# Patient Record
Sex: FEMALE | Race: WHITE | NOT HISPANIC OR LATINO | Employment: FULL TIME | URBAN - METROPOLITAN AREA
[De-identification: names, ages, dates, MRNs, and addresses within clinical notes are randomized per-mention and may not be internally consistent; named-entity substitution may affect disease eponyms.]

---

## 2017-01-31 DIAGNOSIS — Z30.9 ENCOUNTER FOR CONTRACEPTIVE MANAGEMENT: ICD-10-CM

## 2017-01-31 RX ORDER — NORGESTREL AND ETHINYL ESTRADIOL 0.3-0.03MG
KIT ORAL
Qty: 84 TABLET | Refills: 0 | Status: SHIPPED | OUTPATIENT
Start: 2017-01-31 | End: 2017-05-31 | Stop reason: SDUPTHER

## 2017-05-31 DIAGNOSIS — Z00.00 ROUTINE GENERAL MEDICAL EXAMINATION AT A HEALTH CARE FACILITY: Primary | ICD-10-CM

## 2017-05-31 DIAGNOSIS — Z30.9 ENCOUNTER FOR CONTRACEPTIVE MANAGEMENT, UNSPECIFIED TYPE: ICD-10-CM

## 2017-05-31 NOTE — TELEPHONE ENCOUNTER
----- Message from Janett Bear sent at 5/31/2017 12:07 PM CDT -----  Contact: pt   Doctor appointment and lab have been scheduled.  Please link lab orders to the lab appointment.  Date of doctor appointment:  9/13  Physical or EP:  epp  Date of lab appointment:  9/6  Comments:

## 2017-05-31 NOTE — TELEPHONE ENCOUNTER
----- Message from Janett Bear sent at 5/31/2017 12:08 PM CDT -----  Contact: pt [phone]  RX request - refill or new RX.  Is this a refill or new RX:  Refill   RX name and strength: LOW-OGESTREL, 28, 0.3-30 mg-mcg per tablet  Directions: once a day   Is this a 30 day or 90 day RX:  30  Pharmacy name and phone #: [facility]  [phone]  Comments:

## 2017-07-10 ENCOUNTER — PATIENT MESSAGE (OUTPATIENT)
Dept: FAMILY MEDICINE | Facility: CLINIC | Age: 47
End: 2017-07-10

## 2017-08-02 ENCOUNTER — OFFICE VISIT (OUTPATIENT)
Dept: OPHTHALMOLOGY | Facility: CLINIC | Age: 47
End: 2017-08-02
Payer: COMMERCIAL

## 2017-08-02 DIAGNOSIS — H52.03 HYPEROPIA, BILATERAL: Primary | ICD-10-CM

## 2017-08-02 DIAGNOSIS — H52.4 PRESBYOPIA: ICD-10-CM

## 2017-08-02 PROCEDURE — 92004 COMPRE OPH EXAM NEW PT 1/>: CPT | Mod: S$GLB,,, | Performed by: OPHTHALMOLOGY

## 2017-08-02 PROCEDURE — 99999 PR PBB SHADOW E&M-EST. PATIENT-LVL II: CPT | Mod: PBBFAC,,, | Performed by: OPHTHALMOLOGY

## 2017-08-02 NOTE — PROGRESS NOTES
Subjective:       Patient ID: Clara Mills is a 46 y.o. female.    Chief Complaint: Eye Exam    HPI  Review of Systems    Objective:      Physical Exam    Assessment:       1. Hyperopia, bilateral    2. Presbyopia        Plan:       RE-Pt wants CL exam for reading vision.        Refer to Dr Fletcher for SCL eval. & yearly F/U.

## 2017-08-03 ENCOUNTER — OFFICE VISIT (OUTPATIENT)
Dept: OPTOMETRY | Facility: CLINIC | Age: 47
End: 2017-08-03
Payer: COMMERCIAL

## 2017-08-03 DIAGNOSIS — H52.4 HYPEROPIA WITH PRESBYOPIA, BILATERAL: Primary | ICD-10-CM

## 2017-08-03 DIAGNOSIS — H52.03 HYPEROPIA WITH PRESBYOPIA, BILATERAL: Primary | ICD-10-CM

## 2017-08-03 PROCEDURE — 99499 UNLISTED E&M SERVICE: CPT | Mod: S$GLB,,, | Performed by: OPTOMETRIST

## 2017-08-03 PROCEDURE — 99999 PR PBB SHADOW E&M-EST. PATIENT-LVL II: CPT | Mod: PBBFAC,,, | Performed by: OPTOMETRIST

## 2017-08-03 PROCEDURE — 92310 CONTACT LENS FITTING OU: CPT | Mod: ,,, | Performed by: OPTOMETRIST

## 2017-08-03 RX ORDER — SULFAMETHOXAZOLE AND TRIMETHOPRIM 800; 160 MG/1; MG/1
1 TABLET ORAL 2 TIMES DAILY
Refills: 0 | COMMUNITY
Start: 2017-06-25 | End: 2017-09-13 | Stop reason: ALTCHOICE

## 2017-08-03 NOTE — PROGRESS NOTES
HPI     DLS: 8/2/2017 with Dr. Alvarez   Pt states near Va /computer is blurry. Pt wear OTC +2.75. Pt denies   floaters, flashes, and eye allergies. Glare is a bother at night. Pt   denies eye sx and procedures.     Pt is interested in trying [facility] CL. This would be pt's first time   wearing CL. Full exam done by Dr Jerry Marino  No gtts    Last edited by Pablo Fletcher, OD on 8/3/2017  2:08 PM. (History)        ROS     Negative for: Constitutional, Gastrointestinal, Neurological, Skin,   Genitourinary, Musculoskeletal, HENT, Endocrine, Cardiovascular, Eyes,   Respiratory, Psychiatric, Allergic/Imm, Heme/Lymph    Last edited by Pablo Fletcher, OD on 8/3/2017  2:08 PM. (History)        Assessment /Plan     For exam results, see Encounter Report.    Hyperopia with presbyopia, bilateral      Pt wishes tot ry monovision--first time wearer.  Good fit/VA w [facility] (OS near)    PLAN:    DISP TRIAL CLS  Reviewed insertion/removal and cleaning  Build up wearing time-4 hours first day, add one hour per day, not to exceed 12 hours per day wear  DW only, clean and disinfect nightly, exchange monthly  Pt advised to use refresh ATs prn for drynes  Discussed adaptation with monovision--cautioned about driving  rtc 1 week CLFU

## 2017-08-03 NOTE — LETTER
August 3, 2017      Pedro Alvarez MD  [address] [facility] [address]           [place] [facility]  [address] [facility] [place] [address]  Phone: [phone]  Fax: [phone]          Patient: Clara Mills   MR Number: [id number]   YOB: 1970   Date of Visit: 8/3/2017       Dear Dr. Pedro Alvarez:    Thank you for referring Clara Mills to me for evaluation. Attached you will find relevant portions of my assessment and plan of care.    If you have questions, please do not hesitate to call me. I look forward to following Clara Mills along with you.    Sincerely,    Pablo Fletcher, OD    Enclosure  CC:  No Recipients    If you would like to receive this communication electronically, please contact [email or web address] or [phone] to request more information on [employer] access.    For providers and/or their staff who would like to refer a patient to [facility], please contact us through our one-stop-shop provider referral line, [facility], at [phone].    If you feel you have received this communication in error or would no longer like to receive these types of communications, please e-mail [email or web address]

## 2017-08-10 ENCOUNTER — OFFICE VISIT (OUTPATIENT)
Dept: OPTOMETRY | Facility: CLINIC | Age: 47
End: 2017-08-10
Payer: COMMERCIAL

## 2017-08-10 DIAGNOSIS — Z46.0 ENCOUNTER FOR FITTING OR ADJUSTMENT OF SPECTACLES OR CONTACT LENSES: Primary | ICD-10-CM

## 2017-08-10 PROCEDURE — 92310 CONTACT LENS FITTING OU: CPT | Mod: ,,, | Performed by: OPTOMETRIST

## 2017-08-10 PROCEDURE — 99499 UNLISTED E&M SERVICE: CPT | Mod: S$GLB,,, | Performed by: OPTOMETRIST

## 2017-09-06 ENCOUNTER — LAB VISIT (OUTPATIENT)
Dept: LAB | Facility: HOSPITAL | Age: 47
End: 2017-09-06
Attending: FAMILY MEDICINE
Payer: COMMERCIAL

## 2017-09-06 DIAGNOSIS — Z00.00 ROUTINE GENERAL MEDICAL EXAMINATION AT A HEALTH CARE FACILITY: ICD-10-CM

## 2017-09-06 LAB
ALBUMIN SERPL BCP-MCNC: 3.3 G/DL
ALP SERPL-CCNC: 70 U/L
ALT SERPL W/O P-5'-P-CCNC: 21 U/L
ANION GAP SERPL CALC-SCNC: 7 MMOL/L
AST SERPL-CCNC: 27 U/L
BASOPHILS # BLD AUTO: 0.03 K/UL
BASOPHILS NFR BLD: 0.6 %
BILIRUB SERPL-MCNC: 1.1 MG/DL
BUN SERPL-MCNC: 10 MG/DL
CALCIUM SERPL-MCNC: 8.8 MG/DL
CHLORIDE SERPL-SCNC: 106 MMOL/L
CHOLEST SERPL-MCNC: 184 MG/DL
CHOLEST/HDLC SERPL: 2.4 {RATIO}
CO2 SERPL-SCNC: 26 MMOL/L
CREAT SERPL-MCNC: 0.7 MG/DL
DIFFERENTIAL METHOD: ABNORMAL
EOSINOPHIL # BLD AUTO: 0.1 K/UL
EOSINOPHIL NFR BLD: 3 %
ERYTHROCYTE [DISTWIDTH] IN BLOOD BY AUTOMATED COUNT: 12.7 %
EST. GFR  (AFRICAN AMERICAN): >60 ML/MIN/1.73 M^2
EST. GFR  (NON AFRICAN AMERICAN): >60 ML/MIN/1.73 M^2
GLUCOSE SERPL-MCNC: 89 MG/DL
HCT VFR BLD AUTO: 36.7 %
HDLC SERPL-MCNC: 78 MG/DL
HDLC SERPL: 42.4 %
HGB BLD-MCNC: 12.5 G/DL
LDLC SERPL CALC-MCNC: 97.2 MG/DL
LYMPHOCYTES # BLD AUTO: 1.2 K/UL
LYMPHOCYTES NFR BLD: 25.5 %
MCH RBC QN AUTO: 31.6 PG
MCHC RBC AUTO-ENTMCNC: 34.1 G/DL
MCV RBC AUTO: 93 FL
MONOCYTES # BLD AUTO: 0.5 K/UL
MONOCYTES NFR BLD: 10.1 %
NEUTROPHILS # BLD AUTO: 2.8 K/UL
NEUTROPHILS NFR BLD: 60.6 %
NONHDLC SERPL-MCNC: 106 MG/DL
PLATELET # BLD AUTO: 250 K/UL
PMV BLD AUTO: 11.5 FL
POTASSIUM SERPL-SCNC: 4.4 MMOL/L
PROT SERPL-MCNC: 6.3 G/DL
RBC # BLD AUTO: 3.96 M/UL
SODIUM SERPL-SCNC: 139 MMOL/L
TRIGL SERPL-MCNC: 44 MG/DL
TSH SERPL DL<=0.005 MIU/L-ACNC: 2.58 UIU/ML
WBC # BLD AUTO: 4.67 K/UL

## 2017-09-06 PROCEDURE — 84443 ASSAY THYROID STIM HORMONE: CPT

## 2017-09-06 PROCEDURE — 80053 COMPREHEN METABOLIC PANEL: CPT

## 2017-09-06 PROCEDURE — 36415 COLL VENOUS BLD VENIPUNCTURE: CPT | Mod: PO

## 2017-09-06 PROCEDURE — 80061 LIPID PANEL: CPT

## 2017-09-06 PROCEDURE — 85025 COMPLETE CBC W/AUTO DIFF WBC: CPT

## 2017-09-13 ENCOUNTER — OFFICE VISIT (OUTPATIENT)
Dept: FAMILY MEDICINE | Facility: CLINIC | Age: 47
End: 2017-09-13
Payer: COMMERCIAL

## 2017-09-13 VITALS
HEIGHT: 64 IN | SYSTOLIC BLOOD PRESSURE: 100 MMHG | WEIGHT: 132.25 LBS | BODY MASS INDEX: 22.58 KG/M2 | TEMPERATURE: 98 F | DIASTOLIC BLOOD PRESSURE: 78 MMHG | HEART RATE: 66 BPM

## 2017-09-13 DIAGNOSIS — F98.8 ATTENTION DEFICIT DISORDER, UNSPECIFIED HYPERACTIVITY PRESENCE: ICD-10-CM

## 2017-09-13 DIAGNOSIS — B00.9 HSV INFECTION: ICD-10-CM

## 2017-09-13 DIAGNOSIS — Z00.00 ROUTINE GENERAL MEDICAL EXAMINATION AT A HEALTH CARE FACILITY: Primary | ICD-10-CM

## 2017-09-13 PROCEDURE — 99396 PREV VISIT EST AGE 40-64: CPT | Mod: S$GLB,,, | Performed by: FAMILY MEDICINE

## 2017-09-13 PROCEDURE — 90715 TDAP VACCINE 7 YRS/> IM: CPT | Mod: S$GLB,,, | Performed by: FAMILY MEDICINE

## 2017-09-13 PROCEDURE — 90471 IMMUNIZATION ADMIN: CPT | Mod: S$GLB,,, | Performed by: FAMILY MEDICINE

## 2017-09-13 PROCEDURE — 99999 PR PBB SHADOW E&M-EST. PATIENT-LVL III: CPT | Mod: PBBFAC,,, | Performed by: FAMILY MEDICINE

## 2017-09-13 NOTE — PROGRESS NOTES
Subjective:     Patient ID: Clara Mills is a 46 y.o. female.    Chief Complaint: Annual Exam    HPI  Review of Systems   Constitutional: Positive for activity change (she is exercising - going to the gym -3 times a week). Negative for unexpected weight change.   HENT: Negative for hearing loss, rhinorrhea and trouble swallowing.    Eyes: Negative for discharge and visual disturbance.   Respiratory: Negative for chest tightness and wheezing.    Cardiovascular: Negative for chest pain and palpitations.   Gastrointestinal: Negative for blood in stool, constipation, diarrhea and vomiting.   Endocrine: Negative for polydipsia and polyuria.   Genitourinary: Negative for difficulty urinating, dysuria, hematuria and menstrual problem.        Menses doing ok without OCs's,     Still on valcyclavir and doing fine with the that   Musculoskeletal: Negative for arthralgias, joint swelling and neck pain.   Neurological: Negative for weakness and headaches.   Psychiatric/Behavioral: Negative for confusion and dysphoric mood.        Her stress level went down since her job changed so she stopped her ADD meds and is doing ok       Objective:      Physical Exam   Constitutional: She is oriented to person, place, and time. She appears well-developed and well-nourished.   HENT:   Head: Normocephalic and atraumatic.   Right Ear: External ear normal.   Left Ear: External ear normal.   Nose: Nose normal.   Mouth/Throat: Oropharynx is clear and moist.   Eyes: Conjunctivae and EOM are normal. Pupils are equal, round, and reactive to light.   Neck: Normal range of motion. Neck supple. No JVD present. No thyromegaly present.   Cardiovascular: Normal rate, regular rhythm, normal heart sounds and intact distal pulses.    No murmur heard.  Pulmonary/Chest: Effort normal and breath sounds normal.   Abdominal: Soft. Bowel sounds are normal. She exhibits no mass. There is no tenderness.   Musculoskeletal: Normal range of motion. She exhibits  no edema.   Lymphadenopathy:     She has no cervical adenopathy.   Neurological: She is alert and oriented to person, place, and time. She has normal reflexes.   Skin: Skin is warm and dry.   Psychiatric: She has a normal mood and affect. Her behavior is normal. Judgment and thought content normal.   Vitals reviewed.      Assessment:     Clara was seen today for annual exam.    Diagnoses and all orders for this visit:    Routine general medical examination at a health care facility  -     (In Office Administered) Tdap Vaccine    Attention deficit disorder, unspecified hyperactivity presence  Doing well without meds   HSV infection  Continuing current management.    yamile model 5 yr risk of breast cancer 0.8%  Average woman 46 risk 1%   She elects to go every 2 years

## 2017-09-13 NOTE — PROGRESS NOTES
Two patient identifiers verified.  Allergies reviewed.  Tdap IM administered to left deltoid per MD order.  Patient tolerated injection well; no redness, bleeding, or bruising noted to injection site.  Patient instructed to remain in clinic setting for 15 minutes.  Verbalizes understanding.

## 2018-02-15 ENCOUNTER — OFFICE VISIT (OUTPATIENT)
Dept: OPTOMETRY | Facility: CLINIC | Age: 48
End: 2018-02-15
Payer: COMMERCIAL

## 2018-02-15 ENCOUNTER — PATIENT MESSAGE (OUTPATIENT)
Dept: FAMILY MEDICINE | Facility: CLINIC | Age: 48
End: 2018-02-15

## 2018-02-15 DIAGNOSIS — H52.03 HYPEROPIA WITH PRESBYOPIA, BILATERAL: Primary | ICD-10-CM

## 2018-02-15 DIAGNOSIS — H52.4 HYPEROPIA WITH PRESBYOPIA, BILATERAL: Primary | ICD-10-CM

## 2018-02-15 DIAGNOSIS — Z46.0 FITTING AND ADJUSTMENT OF SPECTACLES AND CONTACT LENSES: Primary | ICD-10-CM

## 2018-02-15 DIAGNOSIS — Z12.39 SCREENING BREAST EXAMINATION: Primary | ICD-10-CM

## 2018-02-15 PROCEDURE — 92310 CONTACT LENS FITTING OU: CPT | Mod: ,,, | Performed by: OPTOMETRIST

## 2018-02-15 PROCEDURE — 92015 DETERMINE REFRACTIVE STATE: CPT | Mod: S$GLB,,, | Performed by: OPTOMETRIST

## 2018-02-15 PROCEDURE — 99999 PR PBB SHADOW E&M-EST. PATIENT-LVL II: CPT | Mod: PBBFAC,,, | Performed by: OPTOMETRIST

## 2018-02-15 PROCEDURE — 92012 INTRM OPH EXAM EST PATIENT: CPT | Mod: S$GLB,,, | Performed by: OPTOMETRIST

## 2018-02-15 NOTE — PROGRESS NOTES
HPI     Pt wishes to switch to daily disp monovision    Last edited by Pablo Fletcher, OD on 2/15/2018  7:48 AM. (History)        ROS     Negative for: Constitutional, Gastrointestinal, Neurological, Skin,   Genitourinary, Musculoskeletal, HENT, Endocrine, Cardiovascular, Eyes,   Respiratory, Psychiatric, Allergic/Imm, Heme/Lymph    Last edited by Pablo Fletcher, OD on 2/15/2018  7:48 AM. (History)        Assessment /Plan     For exam results, see Encounter Report.    Hyperopia with presbyopia, bilateral      Last year fit pt in [facility] monovision--now wishes to try daily disp.  Good fit w [facility] 1-day (OS near)--increased plus in both eyes to help at near    PLAN:    1. DISP TRIAL CLs--[facility] 1-day (OS near)  2. Continue Daily Wear schedule.  NO SLEEPING IN CONTACT LENSES.  exchange daily  3. Since had DFE 6 mos ago skipped drops today  4. If no problems with trials will call for CLRx, rtc 1 yr

## 2018-02-19 ENCOUNTER — HOSPITAL ENCOUNTER (OUTPATIENT)
Dept: RADIOLOGY | Facility: HOSPITAL | Age: 48
Discharge: HOME | End: 2018-02-19
Attending: FAMILY MEDICINE
Payer: COMMERCIAL

## 2018-02-19 DIAGNOSIS — Z12.39 SCREENING BREAST EXAMINATION: ICD-10-CM

## 2018-02-19 PROCEDURE — 77067 SCR MAMMO BI INCL CAD: CPT | Mod: 26,,, | Performed by: RADIOLOGY

## 2018-02-19 PROCEDURE — 77067 SCR MAMMO BI INCL CAD: CPT | Mod: TC,PO

## 2018-02-19 PROCEDURE — 77063 BREAST TOMOSYNTHESIS BI: CPT | Mod: 26,,, | Performed by: RADIOLOGY

## 2018-04-20 ENCOUNTER — OFFICE VISIT (OUTPATIENT)
Dept: URGENT CARE | Facility: CLINIC | Age: 48
End: 2018-04-20
Payer: COMMERCIAL

## 2018-04-20 VITALS
HEIGHT: 64 IN | OXYGEN SATURATION: 99 % | RESPIRATION RATE: 18 BRPM | SYSTOLIC BLOOD PRESSURE: 112 MMHG | TEMPERATURE: 98 F | HEART RATE: 72 BPM | BODY MASS INDEX: 22.53 KG/M2 | DIASTOLIC BLOOD PRESSURE: 71 MMHG | WEIGHT: 132 LBS

## 2018-04-20 DIAGNOSIS — R31.9 HEMATURIA, UNSPECIFIED TYPE: ICD-10-CM

## 2018-04-20 DIAGNOSIS — N30.01 ACUTE HEMORRHAGIC CYSTITIS: Primary | ICD-10-CM

## 2018-04-20 LAB
BILIRUB UR QL STRIP: NEGATIVE
GLUCOSE UR QL STRIP: NEGATIVE
KETONES UR QL STRIP: NEGATIVE
LEUKOCYTE ESTERASE UR QL STRIP: NEGATIVE
PH, POC UA: 5 (ref 5–8)
POC BLOOD, URINE: POSITIVE
POC NITRATES, URINE: NEGATIVE
PROT UR QL STRIP: POSITIVE
SP GR UR STRIP: 1 (ref 1–1.03)
UROBILINOGEN UR STRIP-ACNC: ABNORMAL (ref 0.1–1.1)

## 2018-04-20 PROCEDURE — 96372 THER/PROPH/DIAG INJ SC/IM: CPT | Mod: S$GLB,,, | Performed by: EMERGENCY MEDICINE

## 2018-04-20 PROCEDURE — 99214 OFFICE O/P EST MOD 30 MIN: CPT | Mod: 25,S$GLB,, | Performed by: EMERGENCY MEDICINE

## 2018-04-20 PROCEDURE — 81003 URINALYSIS AUTO W/O SCOPE: CPT | Mod: QW,S$GLB,, | Performed by: EMERGENCY MEDICINE

## 2018-04-20 RX ORDER — PHENAZOPYRIDINE HYDROCHLORIDE 100 MG/1
100 TABLET, FILM COATED ORAL 3 TIMES DAILY PRN
Qty: 20 TABLET | Refills: 0 | Status: SHIPPED | OUTPATIENT
Start: 2018-04-20 | End: 2018-10-05

## 2018-04-20 RX ORDER — SULFAMETHOXAZOLE AND TRIMETHOPRIM 800; 160 MG/1; MG/1
1 TABLET ORAL 2 TIMES DAILY
Qty: 14 TABLET | Refills: 0 | Status: SHIPPED | OUTPATIENT
Start: 2018-04-20 | End: 2018-04-27

## 2018-04-20 RX ORDER — CEFTRIAXONE 1 G/1
1 INJECTION, POWDER, FOR SOLUTION INTRAMUSCULAR; INTRAVENOUS
Status: COMPLETED | OUTPATIENT
Start: 2018-04-20 | End: 2018-04-20

## 2018-04-20 RX ADMIN — CEFTRIAXONE 1 G: 1 INJECTION, POWDER, FOR SOLUTION INTRAMUSCULAR; INTRAVENOUS at 05:04

## 2018-04-20 NOTE — PATIENT INSTRUCTIONS
"Rest and hydrate with plenty of fluids. Be sure to continuously drink fluids and also at the first urge to urinate, urinate. If not, you are putting yourself at risk for clot retention and acute urinary retention which would not be good. (catheters, leg bags, urology follow up). So be sure to push fluids and urinate whenever the urge arises.    See Hematuria sheet  See Bladder Infection sheet    Rocephin 1 gram shot given in clinic  Bactrim DS Rx for 7 days  Phenazopyridine 100 mg Rx for the symptoms of burning/urgency/pain  Follow up with PCP and call me/text me with any problems/issues.  Bladder Infection, Female (Adult)    Urine is normally doesn't have any bacteria in it. But bacteria can get into the urinary tract from the skin around the rectum. Or they can travel in the blood from elsewhere in the body. Once they are in your urinary tract, they can cause infection in the urethra (urethritis), the bladder (cystitis), or the kidneys (pyelonephritis).  The most common place for an infection is in the bladder. This is called a bladder infection. This is one of the most common infections in women. Most bladder infections are easily treated. They are not serious unless the infection spreads to the kidney.  The phrases "bladder infection," "UTI," and "cystitis" are often used to describe the same thing. But they are not always the same. Cystitis is an inflammation of the bladder. The most common cause of cystitis is an infection.  Symptoms  The infection causes inflammation in the urethra and bladder. This causes many of the symptoms. The most common symptoms of a bladder infection are:  · Pain or burning when urinating  · Having to urinate more often than usual  · Urgent need to urinate  · Only a small amount of urine comes out  · Blood in urine  · Abdominal discomfort. This is usually in the lower abdomen above the pubic bone.  · Cloudy urine  · Strong- or bad-smelling urine  · Unable to urinate (urinary " retention)  · Unable to hold urine in (urinary incontinence)  · Fever  · Loss of appetite  · Confusion (in older adults)  Causes  Bladder infections are not contagious. You can't get one from someone else, from a toilet seat, or from sharing a bath.  The most common cause of bladder infections is bacteria from the bowels. The bacteria get onto the skin around the opening of the urethra. From there, they can get into the urine and travel up to the bladder, causing inflammation and infection. This usually happens because of:  · Wiping improperly after urinating. Always wipe from front to back.  · Bowel incontinence  · Pregnancy  · Procedures such as having a catheter inserted  · Older age  · Not emptying your bladder. This can allow bacteria a chance to grow in your urine.  · Dehydration  · Constipation  · Sex  · Use of a diaphragm for birth control   Treatment  Bladder infections are diagnosed by a urine test. They are treated with antibiotics and usually clear up quickly without complications. Treatment helps prevent a more serious kidney infection.  Medicines  Medicines can help in the treatment of a bladder infection:  · Take antibiotics until they are used up, even if you feel better. It is important to finish them to make sure the infection has cleared.  · You can use acetaminophen or ibuprofen for pain, fever, or discomfort, unless another medicine was prescribed. If you have chronic liver or kidney disease, talk with your healthcare provider before using these medicines. Also talk with your provider if you've ever had a stomach ulcer or gastrointestinal bleeding, or are taking blood-thinner medicines.  · If you are given phenazopydridine to reduce burning with urination, it will cause your urine to become a bright orange color. This can stain clothing.  Care and prevention  These self-care steps can help prevent future infections:  · Drink plenty of fluids to prevent dehydration and flush out your bladder. Do  this unless you must restrict fluids for other health reasons, or your doctor told you not to.  · Proper cleaning after going to the bathroom is important. Wipe from front to back after using the toilet to prevent the spread of bacteria.  · Urinate more often. Don't try to hold urine in for a long time.  · Wear loose-fitting clothes and cotton underwear. Avoid tight-fitting pants.  · Improve your diet and prevent constipation. Eat more fresh fruit and vegetables, and fiber, and less junk and fatty foods.  · Avoid sex until your symptoms are gone.  · Avoid caffeine, alcohol, and spicy foods. These can irritate your bladder.  · Urinate right after intercourse to flush out your bladder.  · If you use birth control pills and have frequent bladder infections, discuss it with your doctor.  Follow-up care  Call your healthcare provider if all symptoms are not gone after 3 days of treatment. This is especially important if you have repeat infections.  If a culture was done, you will be told if your treatment needs to be changed. If directed, you can call to find out the results.  If X-rays were done, you will be told if the results will affect your treatment.  Call 911  Call 911 if any of the following occur:  · Trouble breathing  · Hard to wake up or confusion  · Fainting or loss of consciousness  · Rapid heart rate  When to seek medical advice  Call your healthcare provider right away if any of these occur:  · Fever of 100.4ºF (38.0ºC) or higher, or as directed by your healthcare provider  · Symptoms are not better by the third day of treatment  · Back or belly (abdominal) pain that gets worse  · Repeated vomiting, or unable to keep medicine down  · Weakness or dizziness  · Vaginal discharge  · Pain, redness, or swelling in the outer vaginal area (labia)  Date Last Reviewed: 10/1/2016  © 0741-8503 [employer]. [address] [place] [address], [place] [address]. All rights reserved. This information is not intended as  a substitute for professional medical care. Always follow your healthcare professional's instructions.        Blood in the Urine    Blood in the urine (hematuria) has many possible causes. If it occurs after an injury (such as a car accident or fall), it is most often a sign of bruising to the kidney or bladder. Common causes of blood in the urine include urinary tract infections, kidney stones, inflammation, tumors, or certain other diseases of the kidney or bladder. Menstruation can cause blood to appear in the urine sample, although it is not coming from the urinary tract.  If only a trace amount of blood is present, it will show up on the urine test, even though the urine may be yellow and not pink or red. This may occur with any of the above conditions, as well as heavy exercise or high fever. In this case, your doctor may want to repeat the urine test on another day. This will show if the blood is still present. If it is, then other tests can be done to find out the cause.  Home care  Follow these home care guidelines:  · If your urine does not appear bloody (pink, brown or red) then you do not need to restrict your activity in any way.  · If you can see blood in your urine, rest and avoid heavy exertion until your next exam. Do not use aspirin, blood thinners, or anti-platelet or anti-inflammatory medicines. These include ibuprofen and naproxen. These thin the blood and may increase bleeding.  Follow-up care  Follow up with your healthcare provider, or as advised. If you were injured and had blood in your urine, you should have a repeat urine test in 1 to 2 days. Contact your doctor for this test.  A radiologist will review any X-rays that were taken. You will be told of any new findings that may affect your care.  When to seek medical advice  Call your healthcare provider right away if any of these occur:  · Bright red blood or blood clots in the urine (if you did not have this before)  · Weakness, dizziness  or fainting  · New groin, abdominal, or back pain  · Fever of 100.4ºF (38ºC) or higher, or as directed by your healthcare provider  · Repeated vomiting  · Bleeding from the nose or gums or easy bruising  Date Last Reviewed: 9/1/2016  © 6119-0634 [employer]. [address] [place] [address]. All rights reserved. This information is not intended as a substitute for professional medical care. Always follow your healthcare professional's instructions.

## 2018-04-20 NOTE — PROGRESS NOTES
"Subjective:       Patient ID: Clara Quick is a 47 y.o. female.    Vitals:    04/20/18 1703   BP: 112/71   Pulse: 72   Resp: 18   Temp: 98.2 °F (36.8 °C)   TempSrc: Oral   SpO2: 99%   Weight: 59.9 kg (132 lb)   Height: 5' 4" (1.626 m)       Chief Complaint: Urinary Tract Infection    Pt noticed blood in her urine today around 3.  Pt states it is also painful to urinate. SHE IS S/P BTL. FINISHED PERIOD LAST WEEK. REPORTS NO BACK PAIN, NO ABDOMINAL PAIN, NO PELVIC PAIN. THE ONLY DISCOMFORT OR PAIN IS THE SUPRAPUBIC PAIN OR SEVERE URGENCY TO USE THE RESTROOM WITH SUBSEQUENT URINE OUTPUT THAT OVER THE LAST 2-3 TIMES HAS BECOME INCREASED DARKER AND ACTUALLY BLOODY UPON PRESENTATION TO CLINIC. SHE IS NOT HAVING HESITANCY AT THIS POINT AND STREAM IS FREE FLOWING. HINT OF A CLOT DURING THE TIME SHE GAVE SAMPLE. I AM VERY FAMILIAR WITH [facility] AS SHE WORKS HERE AND WILL BE ABLE TO FOLLOW UP CLOSELY WITH HER AND SHE HAS BEEN INFORMED TO CONTACT ME BY TEXT IF SHE IS HAVING ANY PROBLEMS OR QUESTIONS OF ANY KIND. NO FEVERS, NO CHILLS, NO FLANK PAIN, NO NAUSEA.       Urinary Tract Infection    This is a new problem. The current episode started acute onset. The problem occurs every urination. The problem has been unchanged. The quality of the pain is described as burning. The pain is at a severity of 8/10. The pain is severe. There has been no fever. The fever has been present for less than 1 day. She is sexually active. Associated symptoms include frequency, hematuria, urgency and bubble bath use. Pertinent negatives include no chills, nausea or vomiting. She has tried nothing for the symptoms. The treatment provided no relief. Her past medical history is significant for recurrent UTIs.     Review of Systems   Constitution: Negative for chills and fever.   Skin: Negative for itching.   Musculoskeletal: Negative for back pain.   Gastrointestinal: Negative for abdominal pain, nausea and vomiting.   Genitourinary: Positive " for dysuria, frequency, hematuria, pelvic pain and urgency. Negative for genital sores, missed menses and non-menstrual bleeding.       Objective:      Physical Exam   Constitutional: She is oriented to person, place, and time. She appears well-developed and well-nourished.   HENT:   Head: Normocephalic and atraumatic.   Right Ear: External ear normal.   Left Ear: External ear normal.   Nose: Nose normal.   Mouth/Throat: Mucous membranes are normal.   Eyes: Conjunctivae and lids are normal.   Neck: Trachea normal and full passive range of motion without pain. Neck supple.   Cardiovascular: Normal rate, regular rhythm and normal heart sounds.    Pulmonary/Chest: Effort normal and breath sounds normal. No respiratory distress.   Abdominal: Soft. Normal appearance and bowel sounds are normal. She exhibits no abdominal bruit and no pulsatile midline mass. There is no tenderness.   Musculoskeletal: Normal range of motion. She exhibits no edema.   Neurological: She is alert and oriented to person, place, and time. She has normal strength.   Skin: Skin is warm, dry and intact. She is not diaphoretic. No pallor.   Psychiatric: She has a normal mood and affect. Her speech is normal and behavior is normal. Cognition and memory are normal.   Nursing note and vitals reviewed.      Office Visit on 04/20/2018   Component Date Value Ref Range Status    POC Blood, Urine 04/20/2018 Positive* Negative Final    POC Bilirubin, Urine 04/20/2018 Negative  Negative Final    POC Urobilinogen, Urine 04/20/2018 norm  0.1 - 1.1 Final    POC Ketones, Urine 04/20/2018 Negative  Negative Final    POC Protein, Urine 04/20/2018 Positive* Negative Final    POC Nitrates, Urine 04/20/2018 Negative  Negative Final    POC Glucose, Urine 04/20/2018 Negative  Negative Final    pH, UA 04/20/2018 5.0  5 - 8 Final    POC Specific Gravity, Urine 04/20/2018 1.005  1.003 - 1.029 Final    POC Leukocytes, Urine 04/20/2018 Negative  Negative Final  "      Assessment:       1. Acute hemorrhagic cystitis    2. Hematuria, unspecified type        Plan:       Clara was seen today for urinary tract infection.    Diagnoses and all orders for this visit:    Acute hemorrhagic cystitis  -     POCT Urinalysis, Dipstick, Automated, W/O Scope  -     sulfamethoxazole-trimethoprim 800-160mg (BACTRIM DS) 800-160 mg Tab; Take 1 tablet by mouth 2 (two) times daily.  -     phenazopyridine (PYRIDIUM) 100 MG tablet; Take 1 tablet (100 mg total) by mouth 3 (three) times daily as needed for Pain.  -     cefTRIAXone injection 1 g; Inject 1 g into the muscle one time.    Hematuria, unspecified type          Patient Instructions   Rest and hydrate with plenty of fluids. Be sure to continuously drink fluids and also at the first urge to urinate, urinate. If not, you are putting yourself at risk for clot retention and acute urinary retention which would not be good. (catheters, leg bags, urology follow up). So be sure to push fluids and urinate whenever the urge arises.    See Hematuria sheet  See Bladder Infection sheet    Rocephin 1 gram shot given in clinic  Bactrim DS Rx for 7 days  Phenazopyridine 100 mg Rx for the symptoms of burning/urgency/pain  Follow up with PCP and call me/text me with any problems/issues.  Bladder Infection, Female (Adult)    Urine is normally doesn't have any bacteria in it. But bacteria can get into the urinary tract from the skin around the rectum. Or they can travel in the blood from elsewhere in the body. Once they are in your urinary tract, they can cause infection in the urethra (urethritis), the bladder (cystitis), or the kidneys (pyelonephritis).  The most common place for an infection is in the bladder. This is called a bladder infection. This is one of the most common infections in women. Most bladder infections are easily treated. They are not serious unless the infection spreads to the kidney.  The phrases "bladder infection," "UTI," and " ""cystitis" are often used to describe the same thing. But they are not always the same. Cystitis is an inflammation of the bladder. The most common cause of cystitis is an infection.  Symptoms  The infection causes inflammation in the urethra and bladder. This causes many of the symptoms. The most common symptoms of a bladder infection are:  · Pain or burning when urinating  · Having to urinate more often than usual  · Urgent need to urinate  · Only a small amount of urine comes out  · Blood in urine  · Abdominal discomfort. This is usually in the lower abdomen above the pubic bone.  · Cloudy urine  · Strong- or bad-smelling urine  · Unable to urinate (urinary retention)  · Unable to hold urine in (urinary incontinence)  · Fever  · Loss of appetite  · Confusion (in older adults)  Causes  Bladder infections are not contagious. You can't get one from someone else, from a toilet seat, or from sharing a bath.  The most common cause of bladder infections is bacteria from the bowels. The bacteria get onto the skin around the opening of the urethra. From there, they can get into the urine and travel up to the bladder, causing inflammation and infection. This usually happens because of:  · Wiping improperly after urinating. Always wipe from front to back.  · Bowel incontinence  · Pregnancy  · Procedures such as having a catheter inserted  · Older age  · Not emptying your bladder. This can allow bacteria a chance to grow in your urine.  · Dehydration  · Constipation  · Sex  · Use of a diaphragm for birth control   Treatment  Bladder infections are diagnosed by a urine test. They are treated with antibiotics and usually clear up quickly without complications. Treatment helps prevent a more serious kidney infection.  Medicines  Medicines can help in the treatment of a bladder infection:  · Take antibiotics until they are used up, even if you feel better. It is important to finish them to make sure the infection has " cleared.  · You can use acetaminophen or ibuprofen for pain, fever, or discomfort, unless another medicine was prescribed. If you have chronic liver or kidney disease, talk with your healthcare provider before using these medicines. Also talk with your provider if you've ever had a stomach ulcer or gastrointestinal bleeding, or are taking blood-thinner medicines.  · If you are given phenazopydridine to reduce burning with urination, it will cause your urine to become a bright orange color. This can stain clothing.  Care and prevention  These self-care steps can help prevent future infections:  · Drink plenty of fluids to prevent dehydration and flush out your bladder. Do this unless you must restrict fluids for other health reasons, or your doctor told you not to.  · Proper cleaning after going to the bathroom is important. Wipe from front to back after using the toilet to prevent the spread of bacteria.  · Urinate more often. Don't try to hold urine in for a long time.  · Wear loose-fitting clothes and cotton underwear. Avoid tight-fitting pants.  · Improve your diet and prevent constipation. Eat more fresh fruit and vegetables, and fiber, and less junk and fatty foods.  · Avoid sex until your symptoms are gone.  · Avoid caffeine, alcohol, and spicy foods. These can irritate your bladder.  · Urinate right after intercourse to flush out your bladder.  · If you use birth control pills and have frequent bladder infections, discuss it with your doctor.  Follow-up care  Call your healthcare provider if all symptoms are not gone after 3 days of treatment. This is especially important if you have repeat infections.  If a culture was done, you will be told if your treatment needs to be changed. If directed, you can call to find out the results.  If X-rays were done, you will be told if the results will affect your treatment.  Call 911  Call 911 if any of the following occur:  · Trouble breathing  · Hard to wake up  or confusion  · Fainting or loss of consciousness  · Rapid heart rate  When to seek medical advice  Call your healthcare provider right away if any of these occur:  · Fever of 100.4ºF (38.0ºC) or higher, or as directed by your healthcare provider  · Symptoms are not better by the third day of treatment  · Back or belly (abdominal) pain that gets worse  · Repeated vomiting, or unable to keep medicine down  · Weakness or dizziness  · Vaginal discharge  · Pain, redness, or swelling in the outer vaginal area (labia)  Date Last Reviewed: 10/1/2016  © 4547-5412 [employer]. [address] [place] [address]. All rights reserved. This information is not intended as a substitute for professional medical care. Always follow your healthcare professional's instructions.        Blood in the Urine    Blood in the urine (hematuria) has many possible causes. If it occurs after an injury (such as a car accident or fall), it is most often a sign of bruising to the kidney or bladder. Common causes of blood in the urine include urinary tract infections, kidney stones, inflammation, tumors, or certain other diseases of the kidney or bladder. Menstruation can cause blood to appear in the urine sample, although it is not coming from the urinary tract.  If only a trace amount of blood is present, it will show up on the urine test, even though the urine may be yellow and not pink or red. This may occur with any of the above conditions, as well as heavy exercise or high fever. In this case, your doctor may want to repeat the urine test on another day. This will show if the blood is still present. If it is, then other tests can be done to find out the cause.  Home care  Follow these home care guidelines:  · If your urine does not appear bloody (pink, brown or red) then you do not need to restrict your activity in any way.  · If you can see blood in your urine, rest and avoid heavy exertion until your next exam. Do not  use aspirin, blood thinners, or anti-platelet or anti-inflammatory medicines. These include ibuprofen and naproxen. These thin the blood and may increase bleeding.  Follow-up care  Follow up with your healthcare provider, or as advised. If you were injured and had blood in your urine, you should have a repeat urine test in 1 to 2 days. Contact your doctor for this test.  A radiologist will review any X-rays that were taken. You will be told of any new findings that may affect your care.  When to seek medical advice  Call your healthcare provider right away if any of these occur:  · Bright red blood or blood clots in the urine (if you did not have this before)  · Weakness, dizziness or fainting  · New groin, abdominal, or back pain  · Fever of 100.4ºF (38ºC) or higher, or as directed by your healthcare provider  · Repeated vomiting  · Bleeding from the nose or gums or easy bruising  Date Last Reviewed: 9/1/2016  © 1815-9556 [employer]. [address] [place] [address], [place] [address]. All rights reserved. This information is not intended as a substitute for professional medical care. Always follow your healthcare professional's instructions.

## 2018-04-25 ENCOUNTER — TELEPHONE (OUTPATIENT)
Dept: URGENT CARE | Facility: CLINIC | Age: 48
End: 2018-04-25

## 2018-05-23 ENCOUNTER — PATIENT MESSAGE (OUTPATIENT)
Dept: FAMILY MEDICINE | Facility: CLINIC | Age: 48
End: 2018-05-23

## 2018-06-13 ENCOUNTER — TELEPHONE (OUTPATIENT)
Dept: FAMILY MEDICINE | Facility: CLINIC | Age: 48
End: 2018-06-13

## 2018-06-13 ENCOUNTER — PATIENT MESSAGE (OUTPATIENT)
Dept: FAMILY MEDICINE | Facility: CLINIC | Age: 48
End: 2018-06-13

## 2018-06-13 DIAGNOSIS — Z00.00 ROUTINE GENERAL MEDICAL EXAMINATION AT A HEALTH CARE FACILITY: Primary | ICD-10-CM

## 2018-07-10 ENCOUNTER — TELEPHONE (OUTPATIENT)
Dept: URGENT CARE | Facility: CLINIC | Age: 48
End: 2018-07-10

## 2018-07-10 RX ORDER — CETIRIZINE HYDROCHLORIDE 10 MG/1
10 TABLET ORAL DAILY
Qty: 90 TABLET | Refills: 3 | Status: SHIPPED | OUTPATIENT
Start: 2018-07-10 | End: 2019-08-01 | Stop reason: SDUPTHER

## 2018-07-10 NOTE — TELEPHONE ENCOUNTER
[facility]SENT OVER TO [facility] [address].    7/10/18  BLPMD  16:04

## 2018-10-05 ENCOUNTER — TELEPHONE (OUTPATIENT)
Dept: FAMILY MEDICINE | Facility: CLINIC | Age: 48
End: 2018-10-05

## 2018-12-03 ENCOUNTER — PATIENT MESSAGE (OUTPATIENT)
Dept: FAMILY MEDICINE | Facility: CLINIC | Age: 48
End: 2018-12-03

## 2018-12-07 ENCOUNTER — LAB VISIT (OUTPATIENT)
Dept: LAB | Facility: HOSPITAL | Age: 48
End: 2018-12-07
Attending: FAMILY MEDICINE
Payer: COMMERCIAL

## 2018-12-07 DIAGNOSIS — Z00.00 ROUTINE GENERAL MEDICAL EXAMINATION AT A HEALTH CARE FACILITY: ICD-10-CM

## 2018-12-07 LAB
ALBUMIN SERPL BCP-MCNC: 3.7 G/DL
ALP SERPL-CCNC: 67 U/L
ALT SERPL W/O P-5'-P-CCNC: 14 U/L
ANION GAP SERPL CALC-SCNC: 9 MMOL/L
AST SERPL-CCNC: 20 U/L
BASOPHILS # BLD AUTO: 0.02 K/UL
BASOPHILS NFR BLD: 0.5 %
BILIRUB SERPL-MCNC: 1.2 MG/DL
BUN SERPL-MCNC: 10 MG/DL
CALCIUM SERPL-MCNC: 8.7 MG/DL
CHLORIDE SERPL-SCNC: 106 MMOL/L
CHOLEST SERPL-MCNC: 174 MG/DL
CHOLEST/HDLC SERPL: 2.2 {RATIO}
CO2 SERPL-SCNC: 26 MMOL/L
CREAT SERPL-MCNC: 0.7 MG/DL
DIFFERENTIAL METHOD: ABNORMAL
EOSINOPHIL # BLD AUTO: 0.1 K/UL
EOSINOPHIL NFR BLD: 2.5 %
ERYTHROCYTE [DISTWIDTH] IN BLOOD BY AUTOMATED COUNT: 12.1 %
EST. GFR  (AFRICAN AMERICAN): >60 ML/MIN/1.73 M^2
EST. GFR  (NON AFRICAN AMERICAN): >60 ML/MIN/1.73 M^2
GLUCOSE SERPL-MCNC: 91 MG/DL
HCT VFR BLD AUTO: 37.5 %
HDLC SERPL-MCNC: 78 MG/DL
HDLC SERPL: 44.8 %
HGB BLD-MCNC: 12.1 G/DL
LDLC SERPL CALC-MCNC: 85.8 MG/DL
LYMPHOCYTES # BLD AUTO: 1.3 K/UL
LYMPHOCYTES NFR BLD: 30 %
MCH RBC QN AUTO: 31.5 PG
MCHC RBC AUTO-ENTMCNC: 32.3 G/DL
MCV RBC AUTO: 98 FL
MONOCYTES # BLD AUTO: 0.5 K/UL
MONOCYTES NFR BLD: 10.8 %
NEUTROPHILS # BLD AUTO: 2.5 K/UL
NEUTROPHILS NFR BLD: 55.7 %
NONHDLC SERPL-MCNC: 96 MG/DL
PLATELET # BLD AUTO: 258 K/UL
PMV BLD AUTO: 11 FL
POTASSIUM SERPL-SCNC: 3.7 MMOL/L
PROT SERPL-MCNC: 6.4 G/DL
RBC # BLD AUTO: 3.84 M/UL
SODIUM SERPL-SCNC: 141 MMOL/L
TRIGL SERPL-MCNC: 51 MG/DL
TSH SERPL DL<=0.005 MIU/L-ACNC: 2.55 UIU/ML
WBC # BLD AUTO: 4.43 K/UL

## 2018-12-07 PROCEDURE — 80061 LIPID PANEL: CPT

## 2018-12-07 PROCEDURE — 36415 COLL VENOUS BLD VENIPUNCTURE: CPT

## 2018-12-07 PROCEDURE — 85025 COMPLETE CBC W/AUTO DIFF WBC: CPT

## 2018-12-07 PROCEDURE — 84443 ASSAY THYROID STIM HORMONE: CPT

## 2018-12-07 PROCEDURE — 80053 COMPREHEN METABOLIC PANEL: CPT

## 2018-12-11 ENCOUNTER — OFFICE VISIT (OUTPATIENT)
Dept: FAMILY MEDICINE | Facility: CLINIC | Age: 48
End: 2018-12-11
Payer: COMMERCIAL

## 2018-12-11 ENCOUNTER — HOSPITAL ENCOUNTER (OUTPATIENT)
Dept: RADIOLOGY | Facility: HOSPITAL | Age: 48
Discharge: HOME | End: 2018-12-11
Attending: FAMILY MEDICINE
Payer: COMMERCIAL

## 2018-12-11 VITALS
HEART RATE: 61 BPM | WEIGHT: 138.88 LBS | DIASTOLIC BLOOD PRESSURE: 62 MMHG | TEMPERATURE: 98 F | BODY MASS INDEX: 23.14 KG/M2 | SYSTOLIC BLOOD PRESSURE: 98 MMHG | HEIGHT: 65 IN

## 2018-12-11 DIAGNOSIS — Z00.00 ROUTINE GENERAL MEDICAL EXAMINATION AT A HEALTH CARE FACILITY: Primary | ICD-10-CM

## 2018-12-11 DIAGNOSIS — E80.4 GILBERT'S SYNDROME: ICD-10-CM

## 2018-12-11 DIAGNOSIS — M79.671 RIGHT FOOT PAIN: ICD-10-CM

## 2018-12-11 DIAGNOSIS — B00.9 HSV INFECTION: ICD-10-CM

## 2018-12-11 DIAGNOSIS — F98.8 ATTENTION DEFICIT DISORDER, UNSPECIFIED HYPERACTIVITY PRESENCE: ICD-10-CM

## 2018-12-11 PROCEDURE — 99999 PR PBB SHADOW E&M-EST. PATIENT-LVL IV: CPT | Mod: PBBFAC,,, | Performed by: FAMILY MEDICINE

## 2018-12-11 PROCEDURE — 73630 X-RAY EXAM OF FOOT: CPT | Mod: TC,RT

## 2018-12-11 PROCEDURE — 99396 PREV VISIT EST AGE 40-64: CPT | Mod: S$GLB,,, | Performed by: FAMILY MEDICINE

## 2018-12-11 PROCEDURE — 73630 X-RAY EXAM OF FOOT: CPT | Mod: 26,RT,, | Performed by: RADIOLOGY

## 2018-12-11 RX ORDER — LISDEXAMFETAMINE DIMESYLATE 30 MG/1
30 CAPSULE ORAL EVERY MORNING
Qty: 90 CAPSULE | Refills: 0 | Status: SHIPPED | OUTPATIENT
Start: 2018-12-11 | End: 2019-03-27 | Stop reason: SDUPTHER

## 2018-12-11 RX ORDER — VALACYCLOVIR HYDROCHLORIDE 500 MG/1
500 TABLET, FILM COATED ORAL 2 TIMES DAILY PRN
Qty: 20 TABLET | Refills: 1 | Status: SHIPPED | OUTPATIENT
Start: 2018-12-11 | End: 2020-08-25

## 2018-12-11 NOTE — PATIENT INSTRUCTIONS
Hi All, Here are some recommendations from a friend of mine for primary care people at the [facility].     Dr. Peck is very nice and she works with NP  Lamar Medrano who is very good  as well.   Dr. Ramos is very good.   Dr. Sequeira -very good and [employer] friendly  Dr. Peña Munoz just started here and he seems nice.  He has good availability now since he is new and younger.  Dr. Chance is accepting new patients and is very good.    Dr Matias in OBGYN    Dr Blanca is at [facility] this helps in finding a new PCP.  I recommend looking them up and reading about them and finding the person who seems right for you.  Good luck!  Wishing you all well,  Dr Priscila Leslie

## 2018-12-11 NOTE — PROGRESS NOTES
"Subjective:     Patient ID: Clara Mills is a 47 y.o. female.    Chief Complaint: Annual Exam and Foot Pain (right)    HPI  Review of Systems   Constitutional: Negative for activity change and unexpected weight change.   HENT: Negative for hearing loss, rhinorrhea and trouble swallowing.    Eyes: Negative for discharge and visual disturbance.   Respiratory: Negative for chest tightness and wheezing.    Cardiovascular: Negative for chest pain and palpitations.   Gastrointestinal: Negative for blood in stool, constipation, diarrhea and vomiting.   Endocrine: Negative for polydipsia and polyuria.   Genitourinary: Negative for difficulty urinating, dysuria, hematuria and menstrual problem.   Musculoskeletal: Negative for arthralgias, joint swelling and neck pain.        Rt sided foot pain - for several months- -more of a "burning pain" - worse if she twists her foot while doing aerobics   Neurological: Negative for weakness and headaches.   Psychiatric/Behavioral: Positive for decreased concentration (she started a new job and finding it harder to concentrate. she was on Vyvance in the past and had been doing ok with that but she stopped becasue she felt she didnt need it but with the new job there is so much to take tract of. ). Negative for confusion and dysphoric mood.       Objective:      Physical Exam   Constitutional: She is oriented to person, place, and time. She appears well-developed and well-nourished.   HENT:   Head: Normocephalic and atraumatic.   Right Ear: External ear normal.   Left Ear: External ear normal.   Nose: Nose normal.   Mouth/Throat: Oropharynx is clear and moist.   Eyes: Conjunctivae and EOM are normal. Pupils are equal, round, and reactive to light.   Neck: Normal range of motion. Neck supple. No JVD present. No thyromegaly present.   Cardiovascular: Normal rate, regular rhythm, normal heart sounds and intact distal pulses.   No murmur heard.  Pulmonary/Chest: Effort normal and breath " sounds normal.   Abdominal: Soft. Bowel sounds are normal. She exhibits no mass. There is no tenderness.   Musculoskeletal: Normal range of motion. She exhibits no edema.   Lymphadenopathy:     She has no cervical adenopathy.   Neurological: She is alert and oriented to person, place, and time. She has normal reflexes.   Skin: Skin is warm and dry.   Psychiatric: She has a normal mood and affect. Her behavior is normal. Judgment and thought content normal.   Vitals reviewed.      Assessment:     Clara was seen today for annual exam and foot pain.    Diagnoses and all orders for this visit:    Routine general medical examination at a health care facility    Right foot pain  -     X-Ray Foot 2 View Right; Future    Attention deficit disorder, unspecified hyperactivity presence  -     VYVANSE 30 mg capsule; Take 1 capsule (30 mg total) by mouth every morning.    Gilbert's syndrome    HSV infection  -     valACYclovir (VALTREX) 500 MG tablet; Take 1 tablet (500 mg total) by mouth 2 (two) times daily as needed.

## 2018-12-12 ENCOUNTER — PATIENT MESSAGE (OUTPATIENT)
Dept: FAMILY MEDICINE | Facility: CLINIC | Age: 48
End: 2018-12-12

## 2018-12-12 DIAGNOSIS — I87.2 VENOUS INSUFFICIENCY: Primary | ICD-10-CM

## 2018-12-17 ENCOUNTER — PATIENT MESSAGE (OUTPATIENT)
Dept: FAMILY MEDICINE | Facility: CLINIC | Age: 48
End: 2018-12-17

## 2019-01-07 ENCOUNTER — TELEPHONE (OUTPATIENT)
Dept: OPTOMETRY | Facility: CLINIC | Age: 49
End: 2019-01-07

## 2019-02-18 ENCOUNTER — OFFICE VISIT (OUTPATIENT)
Dept: OPTOMETRY | Facility: CLINIC | Age: 49
End: 2019-02-18
Payer: COMMERCIAL

## 2019-02-18 DIAGNOSIS — H52.4 HYPEROPIA WITH PRESBYOPIA, BILATERAL: Primary | ICD-10-CM

## 2019-02-18 DIAGNOSIS — H52.03 HYPEROPIA WITH PRESBYOPIA, BILATERAL: Primary | ICD-10-CM

## 2019-02-18 DIAGNOSIS — Z46.0 FITTING AND ADJUSTMENT OF SPECTACLES AND CONTACT LENSES: Primary | ICD-10-CM

## 2019-02-18 PROCEDURE — 99999 PR PBB SHADOW E&M-EST. PATIENT-LVL II: CPT | Mod: PBBFAC,,, | Performed by: OPTOMETRIST

## 2019-02-18 PROCEDURE — 92014 COMPRE OPH EXAM EST PT 1/>: CPT | Mod: S$GLB,,, | Performed by: OPTOMETRIST

## 2019-02-18 PROCEDURE — 92310 CONTACT LENS FITTING OU: CPT | Mod: S$GLB,,, | Performed by: OPTOMETRIST

## 2019-02-18 PROCEDURE — 92015 DETERMINE REFRACTIVE STATE: CPT | Mod: S$GLB,,, | Performed by: OPTOMETRIST

## 2019-02-18 PROCEDURE — 92015 PR REFRACTION: ICD-10-PCS | Mod: S$GLB,,, | Performed by: OPTOMETRIST

## 2019-02-18 PROCEDURE — 92014 PR EYE EXAM, EST PATIENT,COMPREHESV: ICD-10-PCS | Mod: S$GLB,,, | Performed by: OPTOMETRIST

## 2019-02-18 PROCEDURE — 99999 PR PBB SHADOW E&M-EST. PATIENT-LVL II: ICD-10-PCS | Mod: PBBFAC,,, | Performed by: OPTOMETRIST

## 2019-02-18 PROCEDURE — 92310 PR CONTACT LENS FITTING (NO CHANGE): ICD-10-PCS | Mod: S$GLB,,, | Performed by: OPTOMETRIST

## 2019-02-18 NOTE — PROGRESS NOTES
"HPI     Wears daily disp CLs, but feels VA getting worse    Last edited by Pablo Fletcher, OD on 2/18/2019  7:55 AM. (History)        ROS     Negative for: Constitutional, Gastrointestinal, Neurological, Skin,   Genitourinary, Musculoskeletal, HENT, Endocrine, Cardiovascular, Eyes,   Respiratory, Psychiatric, Allergic/Imm, Heme/Lymph    Last edited by Pablo Fletcher, OD on 2/18/2019  7:55 AM. (History)        Assessment /Plan     For exam results, see Encounter Report.    Hyperopia with presbyopia, bilateral      Pt wears mono daily disp CLs--good fit 2 OASYS 1-DAY, but pt feels both distance and near VA getting worse.  Discussed if make near VA better will make distance WORSE. Pt wishes to try stronger CL for near    PLAN:    1. OK to wear otc +1.00 for distance, +3.00 for near  2. ORDER TRIALS: OASYS 1-DAY w inc plus in "near" eye OS--I do NOT need to see pt at disp  3. If no problems pt will darin for CLRx, rtc 1 yr  4. If does have distance problems w new Cls she will message me, and I discussed she could get distance spex to wear OVER mono CLs for night driving/etc                 "

## 2019-02-23 ENCOUNTER — PATIENT MESSAGE (OUTPATIENT)
Dept: OPTOMETRY | Facility: CLINIC | Age: 49
End: 2019-02-23

## 2019-03-27 ENCOUNTER — PATIENT MESSAGE (OUTPATIENT)
Dept: FAMILY MEDICINE | Facility: CLINIC | Age: 49
End: 2019-03-27

## 2019-03-27 DIAGNOSIS — F98.8 ATTENTION DEFICIT DISORDER, UNSPECIFIED HYPERACTIVITY PRESENCE: ICD-10-CM

## 2019-03-27 RX ORDER — LISDEXAMFETAMINE DIMESYLATE 30 MG/1
30 CAPSULE ORAL EVERY MORNING
Qty: 90 CAPSULE | Refills: 0 | Status: SHIPPED | OUTPATIENT
Start: 2019-03-27 | End: 2019-08-01 | Stop reason: SDUPTHER

## 2019-07-19 ENCOUNTER — PATIENT MESSAGE (OUTPATIENT)
Dept: FAMILY MEDICINE | Facility: CLINIC | Age: 49
End: 2019-07-19

## 2019-07-29 NOTE — PROGRESS NOTES
"ANNUAL VISIT NOTE     PRESENTING HISTORY     Reason for Visit:  Annual visit.    No chief complaint on file.      History of Present Illness & ROS: Ms. Clara Mills is a 48 y.o. female. Very nice lady. 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   Here for Annual, medication refills, and request to est care with new provider.   Former patient of Dr. OSITO Leslie.  Works out.   Had some "GI symptoms" on Monday, described as "diarrhea and crampy" abdomen. Reportedly seems to be dissipating.       Review of Systems:  Eyes: denies visual changes at this time denies floaters   ENT: no nasal congestion or sore throat  Respiratory: no cough or shorness of breath  Cardiovascular: no chest pain or palpitations  Genitourinary: no hematuria or dysuria; denies frequency  Hematologic/Lymphatic: no easy bruising or lymphadenopathy  Musculoskeletal: no arthralgias or myalgias  Neurological: no seizures or tremors  Endocrine: no heat or cold intolerance    PAST HISTORY:     Past Medical History:   Diagnosis Date    ADD (attention deficit disorder)     HSV infection        Past Surgical History:   Procedure Laterality Date    BUNIONECTOMY Right 09/2016    BUNIONECTOMY Left 12/2016    OTHER SURGICAL HISTORY  1998    uterine suspension    TUBAL LIGATION         Family History   Problem Relation Age of Onset    Other Mother         eye problem    Squamous cell carcinoma Father         with radiation    Cancer Father         skin    No Known Problems Brother     No Known Problems Daughter     No Known Problems Son     No Known Problems Brother     No Known Problems Brother        Social History     Socioeconomic History    Marital status:      Spouse name: Not on file    Number of children: Not on file    Years of education: Not on file    Highest education level: Not on file   Occupational History    Occupation:    Social Needs    Financial resource strain: Not on file    Food insecurity:     Worry: Not on " file     Inability: Not on file    Transportation needs:     Medical: Not on file     Non-medical: Not on file   Tobacco Use    Smoking status: Former Smoker     Years: 20.00    Smokeless tobacco: Never Used   Substance and Sexual Activity    Alcohol use: Yes     Alcohol/week: 3.0 oz     Types: 5 Glasses of wine per week    Drug use: No    Sexual activity: Yes     Partners: Male     Birth control/protection: See Surgical Hx   Lifestyle    Physical activity:     Days per week: Not on file     Minutes per session: Not on file    Stress: Not on file   Relationships    Social connections:     Talks on phone: Not on file     Gets together: Not on file     Attends Zoroastrianism service: Not on file     Active member of club or organization: Not on file     Attends meetings of clubs or organizations: Not on file     Relationship status: Not on file   Other Topics Concern    Not on file   Social History Narrative    Single , lives with boyfriend, 2 children, works [employer]       MEDICATIONS & ALLERGIES:     Current Outpatient Medications on File Prior to Visit   Medication Sig Dispense Refill    cetirizine (ZYRTEC) 10 MG tablet Take 1 tablet (10 mg total) by mouth once daily. 90 tablet 3    valACYclovir (VALTREX) 500 MG tablet Take 1 tablet (500 mg total) by mouth 2 (two) times daily as needed. 20 tablet 1    VYVANSE 30 mg capsule Take 1 capsule (30 mg total) by mouth every morning. 90 capsule 0     No current facility-administered medications on file prior to visit.         Review of patient's allergies indicates:  No Known Allergies    Medications Reconciliation:   I have reconciled the patient's home medications and discharge medications with the patient/family. I have updated all changes.  Refer to After-Visit Medication List.    OBJECTIVE:     Vital Signs:  There were no vitals filed for this visit.  Wt Readings from Last 1 Encounters:   12/11/18 1118 63 kg (138 lb 14.2 oz)     There is no height or weight  on file to calculate BMI.     Wt Readings from Last 3 Encounters:   12/11/18 63 kg (138 lb 14.2 oz)   04/20/18 59.9 kg (132 lb)   09/13/17 60 kg (132 lb 4.4 oz)     Temp Readings from Last 3 Encounters:   12/11/18 98.4 °F (36.9 °C) (Oral)   04/20/18 98.2 °F (36.8 °C) (Oral)   09/13/17 98.4 °F (36.9 °C)     BP Readings from Last 3 Encounters:   08/01/19 108/60   12/11/18 98/62   04/20/18 112/71     Pulse Readings from Last 3 Encounters:   08/01/19 64   12/11/18 61   04/20/18 72         Physical Exam:  General: Well developed, well nourished. No distress.  HEENT: Head is normocephalic, atraumatic; ears are normal.   Eyes: Clear conjunctiva.  Neck: Supple, symmetrical neck; trachea midline.  Lungs: Clear to auscultation bilaterally and normal respiratory effort.  Cardiovascular: Heart with regular rate and rhythm. No murmurs, gallops or rubs  Extremities: No LE edema. Pulses 2+ and symmetric.   Abdomen: Abdomen is soft, non-tender non-distended with normal bowel sounds.  Skin: Skin color, texture, turgor normal. No rashes.  Musculoskeletal: Normal gait.   Lymph Nodes: No cervical or supraclavicular adenopathy.  Neurologic: Normal strength and tone. No focal numbness or weakness.   Psychiatric: Not depressed.    Laboratory  Lab Results   Component Value Date    WBC 4.43 12/07/2018    HGB 12.1 12/07/2018    HCT 37.5 12/07/2018     12/07/2018    CHOL 174 12/07/2018    TRIG 51 12/07/2018    HDL 78 (H) 12/07/2018    ALT 14 12/07/2018    AST 20 12/07/2018     12/07/2018    K 3.7 12/07/2018     12/07/2018    CREATININE 0.7 12/07/2018    BUN 10 12/07/2018    CO2 26 12/07/2018    TSH 2.546 12/07/2018         ASSESSMENT & PLAN:     Answers for HPI/ROS submitted by the patient on 8/1/2019   activity change: No  unexpected weight change: No  neck pain: No  hearing loss: No  rhinorrhea: No  trouble swallowing: No  eye discharge: No  visual disturbance: No  chest tightness: No  wheezing: No  chest pain:  No  palpitations: No  blood in stool: No  constipation: No  vomiting: No  diarrhea: No  polydipsia: No  polyuria: No  difficulty urinating: No  hematuria: No  menstrual problem: Yes  dysuria: No  joint swelling: No  arthralgias: No  headaches: No  weakness: No  confusion: No  dysphoric mood: No    Preventative health care  -     Comprehensive metabolic panel; Future; Expected date: 08/01/2019  -     CBC auto differential; Future; Expected date: 08/01/2019  -     Lipid panel; Future; Expected date: 08/01/2019  -     Hemoglobin A1c; Future; Expected date: 08/01/2019  -     TSH; Future; Expected date: 08/01/2019  -     Vitamin D; Future; Expected date: 08/01/2019  -     Mammo Digital Screening Bilateral With CAD; Future; Expected date: 08/01/2019    Attention deficit disorder, unspecified hyperactivity presence  -     Comprehensive metabolic panel; Future; Expected date: 08/01/2019  -     CBC auto differential; Future; Expected date: 08/01/2019  -     Lipid panel; Future; Expected date: 08/01/2019  -     Hemoglobin A1c; Future; Expected date: 08/01/2019  -     TSH; Future; Expected date: 08/01/2019  -     Vitamin D; Future; Expected date: 08/01/2019  -     Mammo Digital Screening Bilateral With CAD; Future; Expected date: 08/01/2019  -     lisdexamfetamine (VYVANSE) 30 MG capsule; Take 1 capsule (30 mg total) by mouth every morning.  Dispense: 90 capsule; Refill: 0    Gilbert's syndrome  -     Comprehensive metabolic panel; Future; Expected date: 08/01/2019  -     CBC auto differential; Future; Expected date: 08/01/2019  -     Lipid panel; Future; Expected date: 08/01/2019  -     Hemoglobin A1c; Future; Expected date: 08/01/2019  -     TSH; Future; Expected date: 08/01/2019  -     Vitamin D; Future; Expected date: 08/01/2019  -     Mammo Digital Screening Bilateral With CAD; Future; Expected date: 08/01/2019    Other orders  -     cetirizine (ZYRTEC) 10 MG tablet; Take 1 tablet (10 mg total) by mouth daily as needed  for Allergies.  Dispense: 90 tablet; Refill: 3      Medical Issues:   ADD:   Vyvanse; will not be managed long term by this provider; will need to be seen by Physician to continue to get her refills after today; will refill a 30 day supply in the interim.     HSV:   PRN Valtrex    Suspect recent Gastroenteritis:   Symptoms are improving; have advised to reach back out if her symptoms should not continue to get better over the next couple of days. May need to have stool checked (c/o diarrhea).     *Suggest follow up with Dr. KALINA Becerril in 4 weeks to fully est care with our practice.         Future Appointments   Date Time Provider Department Center   8/1/2019  7:40 AM LAB[facility]        Medication List           Accurate as of 8/1/19  7:39 AM. If you have any questions, ask your nurse or doctor.               CHANGE how you take these medications    cetirizine 10 MG tablet  Commonly known as:  ZyrTEC  Take 1 tablet (10 mg total) by mouth daily as needed for Allergies.  What changed:    · when to take this  · reasons to take this  Changed by:  SOBIA Shaw        CONTINUE taking these medications    lisdexamfetamine 30 MG capsule  Commonly known as:  VYVANSE  Take 1 capsule (30 mg total) by mouth every morning.     valACYclovir 500 MG tablet  Commonly known as:  VALTREX  Take 1 tablet (500 mg total) by mouth 2 (two) times daily as needed.           Where to Get Your Medications      These medications were sent to [facility]/pharmacy #[id number] - [place], [place] - [address]  [address], [facility] [address]    Phone:  [phone]   · lisdexamfetamine 30 MG capsule     Information about where to get these medications is not yet available    Ask your nurse or doctor about these medications  · cetirizine 10 MG tablet         Signing Physician:  SOBIA Shaw

## 2019-08-01 ENCOUNTER — PATIENT MESSAGE (OUTPATIENT)
Dept: INTERNAL MEDICINE | Facility: CLINIC | Age: 49
End: 2019-08-01

## 2019-08-01 ENCOUNTER — LAB VISIT (OUTPATIENT)
Dept: LAB | Facility: HOSPITAL | Age: 49
End: 2019-08-01
Attending: NURSE PRACTITIONER
Payer: COMMERCIAL

## 2019-08-01 ENCOUNTER — TELEPHONE (OUTPATIENT)
Dept: INTERNAL MEDICINE | Facility: CLINIC | Age: 49
End: 2019-08-01

## 2019-08-01 ENCOUNTER — OFFICE VISIT (OUTPATIENT)
Dept: INTERNAL MEDICINE | Facility: CLINIC | Age: 49
End: 2019-08-01
Payer: COMMERCIAL

## 2019-08-01 VITALS — HEART RATE: 64 BPM | SYSTOLIC BLOOD PRESSURE: 108 MMHG | DIASTOLIC BLOOD PRESSURE: 60 MMHG | RESPIRATION RATE: 18 BRPM

## 2019-08-01 DIAGNOSIS — F98.8 ATTENTION DEFICIT DISORDER, UNSPECIFIED HYPERACTIVITY PRESENCE: ICD-10-CM

## 2019-08-01 DIAGNOSIS — Z00.00 PREVENTATIVE HEALTH CARE: Primary | ICD-10-CM

## 2019-08-01 DIAGNOSIS — E80.4 GILBERT'S SYNDROME: ICD-10-CM

## 2019-08-01 DIAGNOSIS — Z00.00 PREVENTATIVE HEALTH CARE: ICD-10-CM

## 2019-08-01 LAB
25(OH)D3+25(OH)D2 SERPL-MCNC: 31 NG/ML (ref 30–96)
ALBUMIN SERPL BCP-MCNC: 4.2 G/DL (ref 3.5–5.2)
ALP SERPL-CCNC: 64 U/L (ref 55–135)
ALT SERPL W/O P-5'-P-CCNC: 12 U/L (ref 10–44)
ANION GAP SERPL CALC-SCNC: 7 MMOL/L (ref 8–16)
AST SERPL-CCNC: 23 U/L (ref 10–40)
BASOPHILS # BLD AUTO: 0.03 K/UL (ref 0–0.2)
BASOPHILS NFR BLD: 0.7 % (ref 0–1.9)
BILIRUB SERPL-MCNC: 0.8 MG/DL (ref 0.1–1)
BUN SERPL-MCNC: 15 MG/DL (ref 6–20)
CALCIUM SERPL-MCNC: 9.3 MG/DL (ref 8.7–10.5)
CHLORIDE SERPL-SCNC: 105 MMOL/L (ref 95–110)
CHOLEST SERPL-MCNC: 190 MG/DL (ref 120–199)
CHOLEST/HDLC SERPL: 2.1 {RATIO} (ref 2–5)
CO2 SERPL-SCNC: 26 MMOL/L (ref 23–29)
CREAT SERPL-MCNC: 0.8 MG/DL (ref 0.5–1.4)
DIFFERENTIAL METHOD: ABNORMAL
EOSINOPHIL # BLD AUTO: 0.1 K/UL (ref 0–0.5)
EOSINOPHIL NFR BLD: 2.2 % (ref 0–8)
ERYTHROCYTE [DISTWIDTH] IN BLOOD BY AUTOMATED COUNT: 12.3 % (ref 11.5–14.5)
EST. GFR  (AFRICAN AMERICAN): >60 ML/MIN/1.73 M^2
EST. GFR  (NON AFRICAN AMERICAN): >60 ML/MIN/1.73 M^2
ESTIMATED AVG GLUCOSE: 88 MG/DL (ref 68–131)
GLUCOSE SERPL-MCNC: 91 MG/DL (ref 70–110)
HBA1C MFR BLD HPLC: 4.7 % (ref 4–5.6)
HCT VFR BLD AUTO: 36.1 % (ref 37–48.5)
HDLC SERPL-MCNC: 89 MG/DL (ref 40–75)
HDLC SERPL: 46.8 % (ref 20–50)
HGB BLD-MCNC: 11.6 G/DL (ref 12–16)
LDLC SERPL CALC-MCNC: 93 MG/DL (ref 63–159)
LYMPHOCYTES # BLD AUTO: 1.2 K/UL (ref 1–4.8)
LYMPHOCYTES NFR BLD: 26.3 % (ref 18–48)
MCH RBC QN AUTO: 30.6 PG (ref 27–31)
MCHC RBC AUTO-ENTMCNC: 32.1 G/DL (ref 32–36)
MCV RBC AUTO: 95 FL (ref 82–98)
MONOCYTES # BLD AUTO: 0.5 K/UL (ref 0.3–1)
MONOCYTES NFR BLD: 11.7 % (ref 4–15)
NEUTROPHILS # BLD AUTO: 2.7 K/UL (ref 1.8–7.7)
NEUTROPHILS NFR BLD: 59.1 % (ref 38–73)
NONHDLC SERPL-MCNC: 101 MG/DL
PLATELET # BLD AUTO: 286 K/UL (ref 150–350)
PMV BLD AUTO: 11.8 FL (ref 9.2–12.9)
POTASSIUM SERPL-SCNC: 3.7 MMOL/L (ref 3.5–5.1)
PROT SERPL-MCNC: 7.1 G/DL (ref 6–8.4)
RBC # BLD AUTO: 3.79 M/UL (ref 4–5.4)
SODIUM SERPL-SCNC: 138 MMOL/L (ref 136–145)
TRIGL SERPL-MCNC: 40 MG/DL (ref 30–150)
TSH SERPL DL<=0.005 MIU/L-ACNC: 1.78 UIU/ML (ref 0.4–4)
WBC # BLD AUTO: 4.6 K/UL (ref 3.9–12.7)

## 2019-08-01 PROCEDURE — 99386 PREV VISIT NEW AGE 40-64: CPT | Mod: S$GLB,,, | Performed by: NURSE PRACTITIONER

## 2019-08-01 PROCEDURE — 80053 COMPREHEN METABOLIC PANEL: CPT

## 2019-08-01 PROCEDURE — 36415 COLL VENOUS BLD VENIPUNCTURE: CPT

## 2019-08-01 PROCEDURE — 85025 COMPLETE CBC W/AUTO DIFF WBC: CPT

## 2019-08-01 PROCEDURE — 99999 PR PBB SHADOW E&M-EST. PATIENT-LVL IV: CPT | Mod: PBBFAC,,, | Performed by: NURSE PRACTITIONER

## 2019-08-01 PROCEDURE — 80061 LIPID PANEL: CPT

## 2019-08-01 PROCEDURE — 82306 VITAMIN D 25 HYDROXY: CPT

## 2019-08-01 PROCEDURE — 99386 PR PREVENTIVE VISIT,NEW,40-64: ICD-10-PCS | Mod: S$GLB,,, | Performed by: NURSE PRACTITIONER

## 2019-08-01 PROCEDURE — 84443 ASSAY THYROID STIM HORMONE: CPT

## 2019-08-01 PROCEDURE — 83036 HEMOGLOBIN GLYCOSYLATED A1C: CPT

## 2019-08-01 PROCEDURE — 99999 PR PBB SHADOW E&M-EST. PATIENT-LVL IV: ICD-10-PCS | Mod: PBBFAC,,, | Performed by: NURSE PRACTITIONER

## 2019-08-01 RX ORDER — LISDEXAMFETAMINE DIMESYLATE 30 MG/1
30 CAPSULE ORAL EVERY MORNING
Qty: 90 CAPSULE | Refills: 0 | Status: SHIPPED | OUTPATIENT
Start: 2019-08-01 | End: 2020-04-22

## 2019-08-01 RX ORDER — CETIRIZINE HYDROCHLORIDE 10 MG/1
10 TABLET ORAL DAILY PRN
Qty: 90 TABLET | Refills: 3
Start: 2019-08-01 | End: 2019-10-30

## 2019-08-01 NOTE — TELEPHONE ENCOUNTER
Vitamin D: 31      Lab Results   Component Value Date    TSH 1.778 08/01/2019       Lab Results   Component Value Date    HGBA1C 4.7 08/01/2019       Lipid Panel   Lab Results   Component Value Date    CHOL 190 08/01/2019    CHOL 174 12/07/2018    CHOL 184 09/06/2017     Lab Results   Component Value Date    HDL 89 (H) 08/01/2019    HDL 78 (H) 12/07/2018    HDL 78 (H) 09/06/2017     Lab Results   Component Value Date    LDLCALC 93.0 08/01/2019    LDLCALC 85.8 12/07/2018    LDLCALC 97.2 09/06/2017     Lab Results   Component Value Date    TRIG 40 08/01/2019    TRIG 51 12/07/2018    TRIG 44 09/06/2017     Lab Results   Component Value Date    CHOLHDL 46.8 08/01/2019    CHOLHDL 44.8 12/07/2018    CHOLHDL 42.4 09/06/2017       CBC  Lab Results   Component Value Date    WBC 4.60 08/01/2019    HGB 11.6 (L) 08/01/2019    HCT 36.1 (L) 08/01/2019    MCV 95 08/01/2019     08/01/2019     CMP  Sodium   Date Value Ref Range Status   08/01/2019 138 136 - 145 mmol/L Final     Potassium   Date Value Ref Range Status   08/01/2019 3.7 3.5 - 5.1 mmol/L Final     Chloride   Date Value Ref Range Status   08/01/2019 105 95 - 110 mmol/L Final     CO2   Date Value Ref Range Status   08/01/2019 26 23 - 29 mmol/L Final     Glucose   Date Value Ref Range Status   08/01/2019 91 70 - 110 mg/dL Final     BUN, Bld   Date Value Ref Range Status   08/01/2019 15 6 - 20 mg/dL Final     Creatinine   Date Value Ref Range Status   08/01/2019 0.8 0.5 - 1.4 mg/dL Final     Calcium   Date Value Ref Range Status   08/01/2019 9.3 8.7 - 10.5 mg/dL Final     Total Protein   Date Value Ref Range Status   08/01/2019 7.1 6.0 - 8.4 g/dL Final     Albumin   Date Value Ref Range Status   08/01/2019 4.2 3.5 - 5.2 g/dL Final     Total Bilirubin   Date Value Ref Range Status   08/01/2019 0.8 0.1 - 1.0 mg/dL Final     Comment:     For infants and newborns, interpretation of results should be based  on gestational age, weight and in agreement with  clinical  observations.  Premature Infant recommended reference ranges:  Up to 24 hours.............<8.0 mg/dL  Up to 48 hours............<12.0 mg/dL  3-5 days..................<15.0 mg/dL  6-29 days.................<15.0 mg/dL       Alkaline Phosphatase   Date Value Ref Range Status   08/01/2019 64 55 - 135 U/L Final     AST   Date Value Ref Range Status   08/01/2019 23 10 - 40 U/L Final     ALT   Date Value Ref Range Status   08/01/2019 12 10 - 44 U/L Final     Anion Gap   Date Value Ref Range Status   08/01/2019 7 (L) 8 - 16 mmol/L Final     eGFR if    Date Value Ref Range Status   08/01/2019 >60 >60 mL/min/1.73 m^2 Final     eGFR if non    Date Value Ref Range Status   08/01/2019 >60 >60 mL/min/1.73 m^2 Final     Comment:     Calculation used to obtain the estimated glomerular filtration  rate (eGFR) is the CKD-EPI equation.          Results released to patient portal    SDJ

## 2019-08-02 ENCOUNTER — TELEPHONE (OUTPATIENT)
Dept: INTERNAL MEDICINE | Facility: CLINIC | Age: 49
End: 2019-08-02

## 2019-08-02 ENCOUNTER — PATIENT MESSAGE (OUTPATIENT)
Dept: INTERNAL MEDICINE | Facility: CLINIC | Age: 49
End: 2019-08-02

## 2019-08-02 NOTE — TELEPHONE ENCOUNTER
----- Message from Chapin Abraham sent at 8/2/2019 11:51 AM CDT -----  Contact: [facility] [phone]  Pharmacy is calling to clarify an RX.  RX name:  lisdexamfetamine (VYVANSE) 30 MG capsule  What do they need to clarify:  Insurance company said they needs a PA or change it to alternative/adderal

## 2019-08-02 NOTE — TELEPHONE ENCOUNTER
----- Message from Chapin Abraham sent at 8/2/2019 11:51 AM CDT -----  Contact: [facility] [phone]  Pharmacy is calling to clarify an RX.  RX name:  lisdexamfetamine (VYVANSE) 30 MG capsule  What do they need to clarify:  Insurance company said they needs a PA or change it to alternative/adderal

## 2019-08-05 ENCOUNTER — PATIENT MESSAGE (OUTPATIENT)
Dept: INTERNAL MEDICINE | Facility: CLINIC | Age: 49
End: 2019-08-05

## 2019-08-07 ENCOUNTER — TELEPHONE (OUTPATIENT)
Dept: INTERNAL MEDICINE | Facility: CLINIC | Age: 49
End: 2019-08-07

## 2019-08-07 ENCOUNTER — HOSPITAL ENCOUNTER (OUTPATIENT)
Dept: RADIOLOGY | Facility: HOSPITAL | Age: 49
Discharge: HOME | End: 2019-08-07
Attending: NURSE PRACTITIONER
Payer: COMMERCIAL

## 2019-08-07 VITALS — BODY MASS INDEX: 21.66 KG/M2 | HEIGHT: 65 IN | WEIGHT: 130 LBS

## 2019-08-07 DIAGNOSIS — E80.4 GILBERT'S SYNDROME: ICD-10-CM

## 2019-08-07 DIAGNOSIS — Z00.00 PREVENTATIVE HEALTH CARE: ICD-10-CM

## 2019-08-07 DIAGNOSIS — F98.8 ATTENTION DEFICIT DISORDER, UNSPECIFIED HYPERACTIVITY PRESENCE: ICD-10-CM

## 2019-08-07 PROCEDURE — 77063 MAMMO DIGITAL SCREENING BILAT WITH TOMOSYNTHESIS_CAD: ICD-10-PCS | Mod: 26,,, | Performed by: RADIOLOGY

## 2019-08-07 PROCEDURE — 77063 BREAST TOMOSYNTHESIS BI: CPT | Mod: 26,,, | Performed by: RADIOLOGY

## 2019-08-07 PROCEDURE — 77067 MAMMO DIGITAL SCREENING BILAT WITH TOMOSYNTHESIS_CAD: ICD-10-PCS | Mod: 26,,, | Performed by: RADIOLOGY

## 2019-08-07 PROCEDURE — 77067 SCR MAMMO BI INCL CAD: CPT | Mod: TC

## 2019-08-07 PROCEDURE — 77067 SCR MAMMO BI INCL CAD: CPT | Mod: 26,,, | Performed by: RADIOLOGY

## 2019-08-07 RX ORDER — DEXTROAMPHETAMINE SACCHARATE, AMPHETAMINE ASPARTATE MONOHYDRATE, DEXTROAMPHETAMINE SULFATE AND AMPHETAMINE SULFATE 5; 5; 5; 5 MG/1; MG/1; MG/1; MG/1
20 CAPSULE, EXTENDED RELEASE ORAL EVERY MORNING
Qty: 30 CAPSULE | Refills: 0 | Status: SHIPPED | OUTPATIENT
Start: 2019-08-07 | End: 2020-04-22

## 2019-08-07 NOTE — TELEPHONE ENCOUNTER
"Received a message in regards to needing a PA on her Vyvanse and no longer covered on her insurance formulary. Insurance is recommending Concerta or Adderall instead.   Clara was off the Vyvanase, after taking for years, but was resumed in December of last year (2018) due to having difficulty with a new job position; difficulty concentrating and focusing, interfering with performance and ability to complete task; since resuming, she reports "doing well". Took sparingly. She has been informed that will send in for 30 tabs of Adderall, but will need to follow up a Physician in [facility] to manage long term, or can refer to Psychiatry.   She was in agreement with the plan.     JN   "

## 2019-08-12 ENCOUNTER — TELEPHONE (OUTPATIENT)
Dept: INTERNAL MEDICINE | Facility: CLINIC | Age: 49
End: 2019-08-12

## 2019-08-12 NOTE — TELEPHONE ENCOUNTER
Impression:  No mammographic evidence of malignancy.     BI-RADS Category 1: Negative     Recommendation:  Routine screening mammogram in 1 year is recommended.     The patient's estimated lifetime risk of breast cancer (to age 85) based on Tyrer-Cuzick - 7 risk assessment model is: Tyrer-Cuzick: 10.47 %. According to the American Cancer Society,  patients with a lifetime breast cancer risk of 20% or higher might benefit from supplemental screening tests.      Assessment     Overall    1 - Negative    Breast Density      Overall   Breast Composition c - Heterogeneously dense                 Results released to patient portal.

## 2019-12-19 RX ORDER — CETIRIZINE HYDROCHLORIDE 10 MG/1
10 TABLET ORAL DAILY PRN
Qty: 90 TABLET | Refills: 3
Start: 2019-12-19 | End: 2020-03-18

## 2020-03-18 ENCOUNTER — TELEPHONE (OUTPATIENT)
Dept: OBSTETRICS AND GYNECOLOGY | Facility: CLINIC | Age: 50
End: 2020-03-18

## 2020-03-18 NOTE — TELEPHONE ENCOUNTER
New pt states her periods have changed.  now heavier and more painful.  Thinks its hormone related.      Calling pt to let her know Dr. Beltran is on the emergency team at the hospital and she would see another MD on Friday.  Pt requesting to wait until Dr. Yoon is back in the office. Rescheduled appt.

## 2020-04-21 DIAGNOSIS — Z01.84 ANTIBODY RESPONSE EXAMINATION: ICD-10-CM

## 2020-04-22 ENCOUNTER — OFFICE VISIT (OUTPATIENT)
Dept: OBSTETRICS AND GYNECOLOGY | Facility: CLINIC | Age: 50
End: 2020-04-22
Payer: COMMERCIAL

## 2020-04-22 VITALS — TEMPERATURE: 99 F | WEIGHT: 136.69 LBS | BODY MASS INDEX: 22.77 KG/M2 | HEIGHT: 65 IN

## 2020-04-22 DIAGNOSIS — N92.4 EXCESSIVE BLEEDING IN PREMENOPAUSAL PERIOD: Primary | ICD-10-CM

## 2020-04-22 DIAGNOSIS — R32 URINARY INCONTINENCE, UNSPECIFIED TYPE: ICD-10-CM

## 2020-04-22 DIAGNOSIS — N92.0 MENORRHAGIA WITH REGULAR CYCLE: ICD-10-CM

## 2020-04-22 PROCEDURE — 99204 PR OFFICE/OUTPT VISIT, NEW, LEVL IV, 45-59 MIN: ICD-10-PCS | Mod: S$GLB,,, | Performed by: OBSTETRICS & GYNECOLOGY

## 2020-04-22 PROCEDURE — 3008F PR BODY MASS INDEX (BMI) DOCUMENTED: ICD-10-PCS | Mod: CPTII,S$GLB,, | Performed by: OBSTETRICS & GYNECOLOGY

## 2020-04-22 PROCEDURE — 99204 OFFICE O/P NEW MOD 45 MIN: CPT | Mod: S$GLB,,, | Performed by: OBSTETRICS & GYNECOLOGY

## 2020-04-22 PROCEDURE — 87481 CANDIDA DNA AMP PROBE: CPT | Mod: 59

## 2020-04-22 PROCEDURE — 87491 CHLMYD TRACH DNA AMP PROBE: CPT

## 2020-04-22 PROCEDURE — 99999 PR PBB SHADOW E&M-EST. PATIENT-LVL III: CPT | Mod: PBBFAC,,, | Performed by: OBSTETRICS & GYNECOLOGY

## 2020-04-22 PROCEDURE — 87661 TRICHOMONAS VAGINALIS AMPLIF: CPT

## 2020-04-22 PROCEDURE — 87801 DETECT AGNT MULT DNA AMPLI: CPT

## 2020-04-22 PROCEDURE — 99999 PR PBB SHADOW E&M-EST. PATIENT-LVL III: ICD-10-PCS | Mod: PBBFAC,,, | Performed by: OBSTETRICS & GYNECOLOGY

## 2020-04-22 PROCEDURE — 3008F BODY MASS INDEX DOCD: CPT | Mod: CPTII,S$GLB,, | Performed by: OBSTETRICS & GYNECOLOGY

## 2020-04-22 PROCEDURE — 87086 URINE CULTURE/COLONY COUNT: CPT

## 2020-04-22 RX ORDER — CETIRIZINE HYDROCHLORIDE 10 MG/1
10 TABLET ORAL DAILY
COMMUNITY
End: 2020-08-25

## 2020-04-22 NOTE — PROGRESS NOTES
"  Chief Complaint: Menorrhagia     HPI:      Clara Mills is a 49 y.o.  who presents complaining of heavier cycles for the past year. Bleeding through tampons multiple times daily. A lot of pain in bilateral lower quadrants. Ripping type pain. Sometimes occurs while on menses and sometimes occurs while not. Worse the first two days of menses. Periods last 7 days.  No bleeding in between.     Currently sexually active. Single partner. Sometimes pain with sex. Pain does not improve with position changes. No hot flashes. No vaginal dryness.   Feelsl taiwo she's leaking urine and has to wear pantyliners. This has been going on for about the past year as well. She is currently using bilateral tubal ligation for contraception. Patient has regular monthly menses. LMP 20.    ROS:     GENERAL: Denies fevers or chills. Feeling well overall.   ABDOMEN: Denies abdominal pain, constipation, diarrhea, nausea, vomiting, change in appetite.   URINARY: Denies frequency, dysuria, hematuria.  NEUROLOGIC: Denies syncope or weakness.     Physical Exam:      PHYSICAL EXAM:  Temp 99 °F (37.2 °C)   Ht 5' 5" (1.651 m)   Wt 62 kg (136 lb 11 oz)   BMI 22.75 kg/m²   Body mass index is 22.75 kg/m².     APPEARANCE: Well nourished, well developed, in no acute distress.  ABDOMEN: Soft.  No tenderness or masses.    PELVIC: Normal external genitalia without lesions.  Normal hair distribution.  Adequate perineal body, normal urethral meatus.  Vagina moist and well rugated without lesions. Small amount of blood within the vaginal vault. Cervix pink, without lesions, discharge or tenderness.  No significant cystocele or rectocele.  Bimanual exam shows uterus to be normal size, regular, mobile and nontender.  Adnexa without masses or tenderness.    EXTREMITIES: No edema.     Results:      US Pelvis Comp with Transvag NON-OB (xpd  Narrative: EXAMINATION:  US PELVIS COMP WITH TRANSVAG NON-OB (XPD)    CLINICAL HISTORY:  Excessive and " frequent menstruation with regular cycle    TECHNIQUE:  Transabdominal sonography of the pelvis was performed, followed by transvaginal sonography to better evaluate the uterus and ovaries.    COMPARISON:  None.    FINDINGS:  Uterus:    Size: 8.5 cm x 4.5 cm x 5.6 cm    Masses: None    Endometrium: The endometrium measures 13 mm.  On the posterior aspect of the body of the endometrial there is a 1.6 cm x 8 mm x 1.7 cm circumscribed, oval echogenic focus.  No definitive associated vascular supply.    Right ovary:    Size: 2.1 cm x 1 cm x 1.9 cm    Appearance: Normal    Vascular flow: Normal.    Left ovary:    Size: 1.9 cm x 2 cm x 1.4 cm    Appearance: Normal    Vascular Flow: Normal.    Free Fluid:    None.  Impression: Today's findings are concerning for an endometrial polyp.  If deemed clinically indicated, tissue sampling may be warranted.    Electronically signed by: China Gamez MD  Date:    04/22/2020  Time:    14:29        Assessment/Plan:     Excessive bleeding in premenopausal period  -     C. trachomatis/N. gonorrhoeae by AMP DNA  -     Vaginosis Screen by DNA Probe    Menorrhagia with regular cycle  -     US Pelvis Comp with Transvag NON-OB (xpd; Future; Expected date: 04/22/2020  -     TSH; Future; Expected date: 04/22/2020  -     CBC Without Differential; Future; Expected date: 04/22/2020    Urinary incontinence, unspecified type  -     Urine culture        Counseling:     Discussed with patient need for surgery for removal of endometrial polyp. Since no etiology can be found for adnexal pain, will plan for diagnostic laparascopy at that time. Given current COVID outbreak, will plan for procedures hopefully in June.     Use of the [facility] Patient Portal discussed and encouraged during today's visit.

## 2020-04-23 ENCOUNTER — LAB VISIT (OUTPATIENT)
Dept: LAB | Facility: HOSPITAL | Age: 50
End: 2020-04-23
Attending: INTERNAL MEDICINE
Payer: COMMERCIAL

## 2020-04-23 ENCOUNTER — PATIENT MESSAGE (OUTPATIENT)
Dept: OBSTETRICS AND GYNECOLOGY | Facility: CLINIC | Age: 50
End: 2020-04-23

## 2020-04-23 DIAGNOSIS — Z01.84 ANTIBODY RESPONSE EXAMINATION: ICD-10-CM

## 2020-04-23 LAB
BACTERIA UR CULT: NORMAL
BACTERIA UR CULT: NORMAL
BACTERIAL VAGINOSIS DNA: NEGATIVE
C TRACH DNA SPEC QL NAA+PROBE: NOT DETECTED
CANDIDA GLABRATA DNA: NEGATIVE
CANDIDA KRUSEI DNA: NEGATIVE
CANDIDA RRNA VAG QL PROBE: NEGATIVE
N GONORRHOEA DNA SPEC QL NAA+PROBE: NOT DETECTED
SARS-COV-2 IGG SERPL QL IA: NEGATIVE
T VAGINALIS RRNA GENITAL QL PROBE: NEGATIVE

## 2020-04-23 PROCEDURE — 86769 SARS-COV-2 COVID-19 ANTIBODY: CPT

## 2020-04-23 PROCEDURE — 36415 COLL VENOUS BLD VENIPUNCTURE: CPT

## 2020-05-08 ENCOUNTER — TELEPHONE (OUTPATIENT)
Dept: OBSTETRICS AND GYNECOLOGY | Facility: CLINIC | Age: 50
End: 2020-05-08

## 2020-05-11 ENCOUNTER — TELEPHONE (OUTPATIENT)
Dept: OBSTETRICS AND GYNECOLOGY | Facility: CLINIC | Age: 50
End: 2020-05-11

## 2020-05-14 ENCOUNTER — TELEPHONE (OUTPATIENT)
Dept: OBSTETRICS AND GYNECOLOGY | Facility: CLINIC | Age: 50
End: 2020-05-14

## 2020-05-14 DIAGNOSIS — N92.0 MENORRHAGIA WITH REGULAR CYCLE: ICD-10-CM

## 2020-05-14 DIAGNOSIS — R10.2 PELVIC PAIN IN FEMALE: ICD-10-CM

## 2020-05-14 DIAGNOSIS — N84.0 ENDOMETRIAL POLYP: Primary | ICD-10-CM

## 2020-05-14 DIAGNOSIS — N92.4 EXCESSIVE BLEEDING IN PREMENOPAUSAL PERIOD: Primary | ICD-10-CM

## 2020-06-18 ENCOUNTER — OFFICE VISIT (OUTPATIENT)
Dept: OPTOMETRY | Facility: CLINIC | Age: 50
End: 2020-06-18
Payer: COMMERCIAL

## 2020-06-18 DIAGNOSIS — Z46.0 FITTING AND ADJUSTMENT OF SPECTACLES AND CONTACT LENSES: Primary | ICD-10-CM

## 2020-06-18 DIAGNOSIS — H52.4 HYPEROPIA WITH PRESBYOPIA, BILATERAL: Primary | ICD-10-CM

## 2020-06-18 DIAGNOSIS — H52.03 HYPEROPIA WITH PRESBYOPIA, BILATERAL: Primary | ICD-10-CM

## 2020-06-18 PROCEDURE — 99999 PR PBB SHADOW E&M-EST. PATIENT-LVL II: CPT | Mod: PBBFAC,,, | Performed by: OPTOMETRIST

## 2020-06-18 PROCEDURE — 92015 DETERMINE REFRACTIVE STATE: CPT | Mod: S$GLB,,, | Performed by: OPTOMETRIST

## 2020-06-18 PROCEDURE — 99999 PR PBB SHADOW E&M-EST. PATIENT-LVL I: CPT | Mod: PBBFAC,,, | Performed by: OPTOMETRIST

## 2020-06-18 PROCEDURE — 92014 PR EYE EXAM, EST PATIENT,COMPREHESV: ICD-10-PCS | Mod: S$GLB,,, | Performed by: OPTOMETRIST

## 2020-06-18 PROCEDURE — 92014 COMPRE OPH EXAM EST PT 1/>: CPT | Mod: S$GLB,,, | Performed by: OPTOMETRIST

## 2020-06-18 PROCEDURE — 92310 CONTACT LENS FITTING OU: CPT | Mod: CSM,S$GLB,, | Performed by: OPTOMETRIST

## 2020-06-18 PROCEDURE — 99999 PR PBB SHADOW E&M-EST. PATIENT-LVL I: ICD-10-PCS | Mod: PBBFAC,,, | Performed by: OPTOMETRIST

## 2020-06-18 PROCEDURE — 92015 PR REFRACTION: ICD-10-PCS | Mod: S$GLB,,, | Performed by: OPTOMETRIST

## 2020-06-18 PROCEDURE — 92310 PR CONTACT LENS FITTING (NO CHANGE): ICD-10-PCS | Mod: CSM,S$GLB,, | Performed by: OPTOMETRIST

## 2020-06-18 PROCEDURE — 99999 PR PBB SHADOW E&M-EST. PATIENT-LVL II: ICD-10-PCS | Mod: PBBFAC,,, | Performed by: OPTOMETRIST

## 2020-06-18 NOTE — PROGRESS NOTES
HPI     DLS:2/18/19  Pt states her [facility] not [facility] with her [facility] ([facility] lisa), pt   states her dist [facility] is  Slightly better with out contacts. Pt is reading   OTC reader +2.75 and states they work good for her. Pt would like to get   an Rx glasses.   No f/f  No gtts      Last edited by Pablo Fletcher, OD on 6/18/2020  8:41 AM. (History)        ROS     Negative for: Constitutional, Gastrointestinal, Neurological, Skin,   Genitourinary, Musculoskeletal, HENT, Endocrine, Cardiovascular, Eyes,   Respiratory, Psychiatric, Allergic/Imm, Heme/Lymph    Last edited by Pablo Fletcher, OD on 6/18/2020  7:58 AM. (History)        Assessment /Plan     For exam results, see Encounter Report.    Hyperopia with presbyopia, bilateral      1.  Good fit/VA w OASYS 1 DAY monovision (OS near).  Now has some small astig OD, but not enough to correct in CLs  2. Discussed could wear OD CL in BOTH eyes for distance when driving/movies/etc, then will need otc readers  3. Dry eye sx w Cls--advised REFRESH PLUS ATs  4. Pt wishes bifocals--discussed PALs/adaptation    PLAN:    1. Wrote spex/CLRx  2. Continue Daily Wear schedule.  NO SLEEPING IN CONTACT LENSES.  exchange daily  3. rtc 1 yr

## 2020-07-01 ENCOUNTER — CLINICAL SUPPORT (OUTPATIENT)
Dept: URGENT CARE | Facility: CLINIC | Age: 50
End: 2020-07-01
Payer: COMMERCIAL

## 2020-07-01 DIAGNOSIS — Z20.822 EXPOSURE TO COVID-19 VIRUS: Primary | ICD-10-CM

## 2020-07-01 PROCEDURE — 99211 OFF/OP EST MAY X REQ PHY/QHP: CPT | Mod: S$GLB,,, | Performed by: FAMILY MEDICINE

## 2020-07-01 PROCEDURE — U0003 INFECTIOUS AGENT DETECTION BY NUCLEIC ACID (DNA OR RNA); SEVERE ACUTE RESPIRATORY SYNDROME CORONAVIRUS 2 (SARS-COV-2) (CORONAVIRUS DISEASE [COVID-19]), AMPLIFIED PROBE TECHNIQUE, MAKING USE OF HIGH THROUGHPUT TECHNOLOGIES AS DESCRIBED BY CMS-2020-01-R: HCPCS

## 2020-07-01 PROCEDURE — 99211 PR OFFICE/OUTPT VISIT, EST, LEVL I: ICD-10-PCS | Mod: S$GLB,,, | Performed by: FAMILY MEDICINE

## 2020-07-08 ENCOUNTER — TELEPHONE (OUTPATIENT)
Dept: URGENT CARE | Facility: CLINIC | Age: 50
End: 2020-07-08

## 2020-07-08 LAB — SARS-COV-2 RNA RESP QL NAA+PROBE: NOT DETECTED

## 2020-07-20 ENCOUNTER — TELEPHONE (OUTPATIENT)
Dept: OBSTETRICS AND GYNECOLOGY | Facility: CLINIC | Age: 50
End: 2020-07-20

## 2020-08-05 ENCOUNTER — TELEPHONE (OUTPATIENT)
Dept: OBSTETRICS AND GYNECOLOGY | Facility: CLINIC | Age: 50
End: 2020-08-05

## 2020-08-05 DIAGNOSIS — N84.0 ENDOMETRIAL POLYP: Primary | ICD-10-CM

## 2020-08-05 DIAGNOSIS — R10.2 PELVIC PAIN IN FEMALE: ICD-10-CM

## 2020-08-25 ENCOUNTER — ANESTHESIA EVENT (OUTPATIENT)
Dept: SURGERY | Facility: OTHER | Age: 50
End: 2020-08-25
Payer: COMMERCIAL

## 2020-08-25 ENCOUNTER — OFFICE VISIT (OUTPATIENT)
Dept: OBSTETRICS AND GYNECOLOGY | Facility: CLINIC | Age: 50
End: 2020-08-25
Payer: COMMERCIAL

## 2020-08-25 ENCOUNTER — HOSPITAL ENCOUNTER (OUTPATIENT)
Dept: PREADMISSION TESTING | Facility: OTHER | Age: 50
Discharge: HOME | End: 2020-08-25
Attending: OBSTETRICS & GYNECOLOGY
Payer: COMMERCIAL

## 2020-08-25 VITALS
OXYGEN SATURATION: 99 % | BODY MASS INDEX: 24.07 KG/M2 | HEART RATE: 66 BPM | DIASTOLIC BLOOD PRESSURE: 53 MMHG | WEIGHT: 141 LBS | TEMPERATURE: 97 F | HEIGHT: 64 IN | SYSTOLIC BLOOD PRESSURE: 105 MMHG

## 2020-08-25 VITALS
BODY MASS INDEX: 23.64 KG/M2 | SYSTOLIC BLOOD PRESSURE: 112 MMHG | HEIGHT: 65 IN | DIASTOLIC BLOOD PRESSURE: 68 MMHG | WEIGHT: 141.88 LBS

## 2020-08-25 DIAGNOSIS — N93.9 ABNORMAL UTERINE BLEEDING (AUB): ICD-10-CM

## 2020-08-25 DIAGNOSIS — N93.9 ABNORMAL UTERINE BLEEDING: Primary | ICD-10-CM

## 2020-08-25 DIAGNOSIS — R10.2 PELVIC PAIN: ICD-10-CM

## 2020-08-25 LAB
ABO + RH BLD: NORMAL
BASOPHILS # BLD AUTO: 0.03 K/UL (ref 0–0.2)
BASOPHILS NFR BLD: 0.5 % (ref 0–1.9)
BLD GP AB SCN CELLS X3 SERPL QL: NORMAL
DIFFERENTIAL METHOD: ABNORMAL
EOSINOPHIL # BLD AUTO: 0.1 K/UL (ref 0–0.5)
EOSINOPHIL NFR BLD: 1.1 % (ref 0–8)
ERYTHROCYTE [DISTWIDTH] IN BLOOD BY AUTOMATED COUNT: 12.3 % (ref 11.5–14.5)
HCT VFR BLD AUTO: 37.8 % (ref 37–48.5)
HGB BLD-MCNC: 12.6 G/DL (ref 12–16)
IMM GRANULOCYTES # BLD AUTO: 0.01 K/UL (ref 0–0.04)
IMM GRANULOCYTES NFR BLD AUTO: 0.2 % (ref 0–0.5)
LYMPHOCYTES # BLD AUTO: 1.8 K/UL (ref 1–4.8)
LYMPHOCYTES NFR BLD: 28.1 % (ref 18–48)
MCH RBC QN AUTO: 31.3 PG (ref 27–31)
MCHC RBC AUTO-ENTMCNC: 33.3 G/DL (ref 32–36)
MCV RBC AUTO: 94 FL (ref 82–98)
MONOCYTES # BLD AUTO: 0.5 K/UL (ref 0.3–1)
MONOCYTES NFR BLD: 7.8 % (ref 4–15)
NEUTROPHILS # BLD AUTO: 3.9 K/UL (ref 1.8–7.7)
NEUTROPHILS NFR BLD: 62.3 % (ref 38–73)
NRBC BLD-RTO: 0 /100 WBC
PLATELET # BLD AUTO: 253 K/UL (ref 150–350)
PMV BLD AUTO: 10.2 FL (ref 9.2–12.9)
RBC # BLD AUTO: 4.03 M/UL (ref 4–5.4)
WBC # BLD AUTO: 6.29 K/UL (ref 3.9–12.7)

## 2020-08-25 PROCEDURE — 99213 PR OFFICE/OUTPT VISIT, EST, LEVL III, 20-29 MIN: ICD-10-PCS | Mod: S$GLB,,, | Performed by: OBSTETRICS & GYNECOLOGY

## 2020-08-25 PROCEDURE — 99999 PR PBB SHADOW E&M-EST. PATIENT-LVL III: ICD-10-PCS | Mod: PBBFAC,,, | Performed by: OBSTETRICS & GYNECOLOGY

## 2020-08-25 PROCEDURE — 3008F PR BODY MASS INDEX (BMI) DOCUMENTED: ICD-10-PCS | Mod: CPTII,S$GLB,, | Performed by: OBSTETRICS & GYNECOLOGY

## 2020-08-25 PROCEDURE — 3008F BODY MASS INDEX DOCD: CPT | Mod: CPTII,S$GLB,, | Performed by: OBSTETRICS & GYNECOLOGY

## 2020-08-25 PROCEDURE — 36415 COLL VENOUS BLD VENIPUNCTURE: CPT

## 2020-08-25 PROCEDURE — 86901 BLOOD TYPING SEROLOGIC RH(D): CPT

## 2020-08-25 PROCEDURE — 85025 COMPLETE CBC W/AUTO DIFF WBC: CPT

## 2020-08-25 PROCEDURE — 99213 OFFICE O/P EST LOW 20 MIN: CPT | Mod: S$GLB,,, | Performed by: OBSTETRICS & GYNECOLOGY

## 2020-08-25 PROCEDURE — 99999 PR PBB SHADOW E&M-EST. PATIENT-LVL III: CPT | Mod: PBBFAC,,, | Performed by: OBSTETRICS & GYNECOLOGY

## 2020-08-25 RX ORDER — DICLOFENAC SODIUM 10 MG/G
GEL TOPICAL
COMMUNITY
End: 2020-08-25

## 2020-08-25 RX ORDER — FAMOTIDINE 20 MG/1
20 TABLET, FILM COATED ORAL
Status: CANCELLED | OUTPATIENT
Start: 2020-08-25 | End: 2020-08-25

## 2020-08-25 RX ORDER — LIDOCAINE HYDROCHLORIDE 10 MG/ML
0.5 INJECTION, SOLUTION EPIDURAL; INFILTRATION; INTRACAUDAL; PERINEURAL ONCE
Status: CANCELLED | OUTPATIENT
Start: 2020-08-25 | End: 2020-08-25

## 2020-08-25 RX ORDER — ACETAMINOPHEN 500 MG
1000 TABLET ORAL
Status: CANCELLED | OUTPATIENT
Start: 2020-08-25 | End: 2020-08-25

## 2020-08-25 RX ORDER — IBUPROFEN 200 MG
200 TABLET ORAL EVERY 6 HOURS PRN
Status: ON HOLD | COMMUNITY
End: 2020-09-01 | Stop reason: HOSPADM

## 2020-08-25 RX ORDER — SODIUM CHLORIDE, SODIUM LACTATE, POTASSIUM CHLORIDE, CALCIUM CHLORIDE 600; 310; 30; 20 MG/100ML; MG/100ML; MG/100ML; MG/100ML
INJECTION, SOLUTION INTRAVENOUS CONTINUOUS
Status: CANCELLED | OUTPATIENT
Start: 2020-08-25

## 2020-08-25 NOTE — H&P
Chief Complaint:  Pre-Operative Visit for Diagnostic Laparoscopy, Hysteroscopy, D&C for evaluation and treatment of AUB, Pelvic Pain    History of Present Illness    Clara Mills is a 49 y.o.  here for preop visit. In May patient presented to clinic for her annual and the following was noted:   heavier cycles for the past year. Bleeding through tampons multiple times daily. A lot of pain in bilateral lower quadrants. Ripping type pain. Pt noted during this last menses (2020) pain was mostly right sided. Sometimes occurs while on menses and sometimes occurs while not. Worse the first two days of menses. Periods last 7 days.  No bleeding in between.     Pre-operative optimization was not needed.    Patient's last menstrual period was 2020. She is currently using bilateral tubal ligation for contraception.    Past Medical History:   Diagnosis Date    ADD (attention deficit disorder)     HSV infection        Past Surgical History:   Procedure Laterality Date    BUNIONECTOMY Right 2016    BUNIONECTOMY Left 2016    OTHER SURGICAL HISTORY      uterine suspension    TUBAL LIGATION      UTERINE SUSPENSION         Family History   Problem Relation Age of Onset    Other Mother         eye problem    Squamous cell carcinoma Father         with radiation    Cancer Father         skin    No Known Problems Brother     No Known Problems Daughter     No Known Problems Son     No Known Problems Brother     No Known Problems Brother        Social History     Tobacco Use   Smoking Status Former Smoker    Years: 5.00   Smokeless Tobacco Never Used       Social History     Substance and Sexual Activity   Sexual Activity Yes    Partners: Male    Birth control/protection: See Surgical Hx        OB History    Para Term  AB Living   3 3 3     2   SAB TAB Ectopic Multiple Live Births           2      # Outcome Date GA Lbr Landon/2nd Weight Sex Delivery Anes PTL Lv   3 Term           "  2 Term            1 Term                Review of Systems  GENERAL: Denies unintentional weight gain or weight loss. Feeling well overall.   SKIN: Denies rash or lesions.   HEENT: Denies headaches, or vision changes.   CARDIOVASCULAR: Denies palpitations or chest pain.   RESPIRATORY: Denies shortness of breath or dyspnea on exertion.  BREASTS: Denies pain, lumps, or nipple discharge.   ABDOMEN: Denies abdominal pain, constipation, diarrhea, nausea, vomiting, change in appetite.  URINARY: Denies frequency, dysuria, hematuria.  NEUROLOGIC: Denies syncope or weakness.   PSYCHIATRIC: Denies depression, anxiety or mood swings.     Objective:     /68   Ht 5' 5" (1.651 m)   Wt 64.4 kg (141 lb 13.9 oz)   LMP 08/04/2020   BMI 23.61 kg/m²     Body mass index is 23.61 kg/m².    APPEARANCE: Well nourished, well developed, in no acute distress.  PSYCH: Appropriate mood and affect.  SKIN: No acne or hirsutism  NECK: Neck symmetric without masses  NODES: No inguinal, axillary, or supraclavicular lymph node enlargement  CHEST: Normal respiratory effort  CV: Normal cap refill.    ABDOMEN: Soft.  No tenderness or masses.    PELVIC: From 4/22/20: Normal external genitalia without lesions.  Normal hair distribution.  Adequate perineal body, normal urethral meatus.  Vagina moist and well rugated without lesions. Small amount of blood within the vaginal vault. Cervix pink, without lesions, discharge or tenderness.  No significant cystocele or rectocele.  Bimanual exam shows uterus to be normal size, regular, mobile and nontender.  Adnexa without masses or tenderness.    EXTREMITIES: No edema       Last Pap: NILM, HPV neg 2/5/2016    Ultrasound:   Results for orders placed in visit on 04/22/20   US Pelvis Comp with Transvag NON-OB (xpd    Narrative EXAMINATION:  US PELVIS COMP WITH TRANSVAG NON-OB (XPD)    CLINICAL HISTORY:  Excessive and frequent menstruation with regular cycle    TECHNIQUE:  Transabdominal sonography of the " pelvis was performed, followed by transvaginal sonography to better evaluate the uterus and ovaries.    COMPARISON:  None.    FINDINGS:  Uterus:    Size: 8.5 cm x 4.5 cm x 5.6 cm    Masses: None    Endometrium: The endometrium measures 13 mm.  On the posterior aspect of the body of the endometrial there is a 1.6 cm x 8 mm x 1.7 cm circumscribed, oval echogenic focus.  No definitive associated vascular supply.    Right ovary:    Size: 2.1 cm x 1 cm x 1.9 cm    Appearance: Normal    Vascular flow: Normal.    Left ovary:    Size: 1.9 cm x 2 cm x 1.4 cm    Appearance: Normal    Vascular Flow: Normal.    Free Fluid:    None.      Impression Today's findings are concerning for an endometrial polyp.  If deemed clinically indicated, tissue sampling may be warranted.      Electronically signed by: China Gamez MD  Date:    04/22/2020  Time:    14:29       Assessment/ Plan:     1. Abnormal uterine bleeding     2. Pelvic pain         1. To OR for Hysteroscopy D&C, Endometrial Ablation, and Diagnostic Laparoscopy on 9/1/20  2. Consent's signed      Counseling     With the patient I had a full discussion of the risks, benefits, and alternatives of all procedures to be performed, including but not limited to injury to other organs, failure to resolve problem, recurrence of disease state, and infection. We discussed the risks, benefits, and alternatives to blood transfusion as well.   Ms. Mills was counseled that because of her underlying h/o abdominal surgeries certain risks of the procedure such as damage to bowel or blood vessels due to scar tissue distorting the normal anatomy may be increased. All of 's questions were answered, and she voiced understanding. She agrees with the plan of care; therefore, we will proceed with the surgery as scheduled.

## 2020-08-25 NOTE — ANESTHESIA PREPROCEDURE EVALUATION
08/25/2020  Clara Mills is a 49 y.o., female.    Anesthesia Evaluation    I have reviewed the Patient Summary Reports.    I have reviewed the Nursing Notes. I have reviewed the NPO Status.   I have reviewed the Medications.     Review of Systems  Anesthesia Hx:  No problems with previous Anesthesia    Social:  Former Smoker, Social Alcohol Use    Hematology/Oncology:  Hematology Normal   Oncology Normal     EENT/Dental:EENT/Dental Normal   Cardiovascular:  Cardiovascular Normal Exercise tolerance: good     Pulmonary:  Pulmonary Normal    Renal/:  Renal/ Normal     Hepatic/GI:   Gilbert's syndrome.   Musculoskeletal:  Musculoskeletal Normal    Neurological:  Neurology Normal    Endocrine:  Endocrine Normal    Dermatological:  Skin Normal    Psych:   Psychiatric History ADD         Physical Exam  General:  Well nourished    Airway/Jaw/Neck:  Airway Findings: Mouth Opening: Normal Tongue: Normal  General Airway Assessment: Adult, Average  Mallampati: II  TM Distance: Normal, at least 6 cm  Jaw/Neck Findings:  Neck ROM: Normal ROM      Dental:  Dental Findings: In tact, Molar caps        Mental Status:  Mental Status Findings:  Cooperative, Alert and Oriented         Anesthesia Plan  Type of Anesthesia, risks & benefits discussed:  Anesthesia Type:  general  Patient's Preference:   Intra-op Monitoring Plan: standard ASA monitors  Intra-op Monitoring Plan Comments:   Post Op Pain Control Plan: per primary service following discharge from PACU  Post Op Pain Control Plan Comments:   Induction:    Beta Blocker:         Informed Consent: Patient understands risks and agrees with Anesthesia plan.  Questions answered. Anesthesia consent signed with patient.  ASA Score: 2     Day of Surgery Review of History & Physical:    H&P update referred to the surgeon.     Anesthesia Plan Notes: CBC        Ready For  Surgery From Anesthesia Perspective.

## 2020-08-25 NOTE — DISCHARGE INSTRUCTIONS
Information to Prepare you for your Surgery    PRE-ADMIT TESTING -  [phone]    [address]  [place] [facility]          Your surgery has been scheduled at [facility]. We are pleased to have the opportunity to serve you. For Further Information please call [phone].    On the day of surgery please report to the Information Desk on the 1st floor.    · CONTACT YOUR PHYSICIAN'S OFFICE THE DAY PRIOR TO YOUR SURGERY TO OBTAIN YOUR ARRIVAL TIME.     · The evening before surgery do not eat anything after 9 p.m. ( this includes hard candy, chewing gum and mints).  You may only have GATORADE, POWERADE AND WATER  from 9 p.m. until you leave your home.   DO NOT DRINK ANY LIQUIDS ON THE WAY TO THE HOSPITAL.      SPECIAL MEDICATION INSTRUCTIONS: TAKE medications checked off by the Anesthesiologist on your Medication List.    Angiogram Patients: Take medications as instructed by your physician, including aspirin.     Surgery Patients:    If you take ASPIRIN - Your PHYSICIAN/SURGEON will need to inform you IF/OR when you need to stop taking aspirin prior to your surgery.     Do Not take any medications containing IBUPROFEN.  Do Not Wear any make-up or dark nail polish   (especially eye make-up) to surgery. If you come to surgery with makeup on you will be required to remove the makeup or nail polish.    Do not shave your surgical area at least 5 days prior to your surgery. The surgical prep will be performed at the hospital according to Infection Control regulations.    Leave all valuables at home.   Do Not wear any jewelry or watches, including any metal in body piercings. Jewelry must be removed prior to coming to the hospital.  There is a possibility that rings that are unable to be removed may be cut off if they are on the surgical extremity.    Contact Lens must be removed before surgery. Either do not wear the contact lens or bring a case and solution for  storage.  Please bring a container for eyeglasses or dentures as required.  Bring any paperwork your physician has provided, such as consent forms,  history and physicals, doctor's orders, etc.   Bring comfortable clothes that are loose fitting to wear upon discharge. Take into consideration the type of surgery being performed.  Maintain your diet as advised per your physician the day prior to surgery.      Adequate rest the night before surgery is advised.   Park in the Parking lot behind the hospital or in the [place] [facility] across the street from the parking lot. Parking is complimentary.  If you will be discharged the same day as your procedure, please arrange for a responsible adult to drive you home or to accompany you if traveling by taxi.   YOU WILL NOT BE PERMITTED TO DRIVE OR TO LEAVE THE HOSPITAL ALONE AFTER SURGERY.   If you are being discharged the same day, it is strongly recommended that you arrange for someone to remain with you for the first 24 hrs following your surgery.    The Surgeon will speak to your family/visitor after your surgery regarding the outcome of your surgery and post op care.  The Surgeon may speak to you after your surgery, but there is a possibility you may not remember the details.  Please check with your family members regarding the conversation with the Surgeon.    We strongly recommend whoever is bringing you home be present for discharge instructions.  This will ensure a thorough understanding for your post op home care.    ALL CHILDREN MUST ALWAYS BE ACCOMPANIED BY AN ADULT.    Visitors-Refer to current Visitor policy handouts.    Thank you for your cooperation.  The Staff of [facility].                Bathing Instructions with Hibiclens     Shower the evening before and morning of your procedure with Hibiclens:   Wash your face with water and your regular face wash/soap   Apply Hibiclens directly on your skin or on a wet washcloth and wash  gently. When showering: Move away from the shower stream when applying Hibiclens to avoid rinsing off too soon.   Rinse thoroughly with warm water   Do not dilute Hibiclens         Dry off as usual, do not use any deodorant, powder, body lotions, perfume, after shave or cologne.

## 2020-08-29 ENCOUNTER — PATIENT MESSAGE (OUTPATIENT)
Dept: SURGERY | Facility: OTHER | Age: 50
End: 2020-08-29

## 2020-08-29 ENCOUNTER — LAB VISIT (OUTPATIENT)
Dept: URGENT CARE | Facility: CLINIC | Age: 50
End: 2020-08-29
Payer: COMMERCIAL

## 2020-08-29 DIAGNOSIS — N92.4 EXCESSIVE BLEEDING IN PREMENOPAUSAL PERIOD: ICD-10-CM

## 2020-08-29 DIAGNOSIS — N92.0 MENORRHAGIA WITH REGULAR CYCLE: ICD-10-CM

## 2020-08-29 PROCEDURE — U0003 INFECTIOUS AGENT DETECTION BY NUCLEIC ACID (DNA OR RNA); SEVERE ACUTE RESPIRATORY SYNDROME CORONAVIRUS 2 (SARS-COV-2) (CORONAVIRUS DISEASE [COVID-19]), AMPLIFIED PROBE TECHNIQUE, MAKING USE OF HIGH THROUGHPUT TECHNOLOGIES AS DESCRIBED BY CMS-2020-01-R: HCPCS

## 2020-08-30 LAB — SARS-COV-2 RNA RESP QL NAA+PROBE: NOT DETECTED

## 2020-09-01 ENCOUNTER — ANESTHESIA (OUTPATIENT)
Dept: SURGERY | Facility: OTHER | Age: 50
End: 2020-09-01
Payer: COMMERCIAL

## 2020-09-01 ENCOUNTER — HOSPITAL ENCOUNTER (OUTPATIENT)
Facility: OTHER | Age: 50
Discharge: HOME | End: 2020-09-01
Attending: OBSTETRICS & GYNECOLOGY | Admitting: OBSTETRICS & GYNECOLOGY
Payer: COMMERCIAL

## 2020-09-01 VITALS
DIASTOLIC BLOOD PRESSURE: 62 MMHG | BODY MASS INDEX: 24.1 KG/M2 | HEIGHT: 64 IN | RESPIRATION RATE: 16 BRPM | HEART RATE: 68 BPM | WEIGHT: 141.13 LBS | SYSTOLIC BLOOD PRESSURE: 115 MMHG | TEMPERATURE: 98 F | OXYGEN SATURATION: 98 %

## 2020-09-01 DIAGNOSIS — N93.9 ABNORMAL UTERINE BLEEDING (AUB): Primary | ICD-10-CM

## 2020-09-01 DIAGNOSIS — R10.2 PELVIC PAIN: ICD-10-CM

## 2020-09-01 LAB
B-HCG UR QL: NEGATIVE
CTP QC/QA: YES

## 2020-09-01 PROCEDURE — 49320 PR LAP,DIAGNOSTIC ABDOMEN: ICD-10-PCS | Mod: 59,,, | Performed by: OBSTETRICS & GYNECOLOGY

## 2020-09-01 PROCEDURE — 25000003 PHARM REV CODE 250: Performed by: SPECIALIST

## 2020-09-01 PROCEDURE — 25000003 PHARM REV CODE 250: Performed by: NURSE ANESTHETIST, CERTIFIED REGISTERED

## 2020-09-01 PROCEDURE — 71000033 HC RECOVERY, INTIAL HOUR: Performed by: OBSTETRICS & GYNECOLOGY

## 2020-09-01 PROCEDURE — 36000709 HC OR TIME LEV III EA ADD 15 MIN: Performed by: OBSTETRICS & GYNECOLOGY

## 2020-09-01 PROCEDURE — 88305 TISSUE EXAM BY PATHOLOGIST: CPT | Mod: 59 | Performed by: PATHOLOGY

## 2020-09-01 PROCEDURE — 27201423 OPTIME MED/SURG SUP & DEVICES STERILE SUPPLY: Performed by: OBSTETRICS & GYNECOLOGY

## 2020-09-01 PROCEDURE — 37000008 HC ANESTHESIA 1ST 15 MINUTES: Performed by: OBSTETRICS & GYNECOLOGY

## 2020-09-01 PROCEDURE — 71000015 HC POSTOP RECOV 1ST HR: Performed by: OBSTETRICS & GYNECOLOGY

## 2020-09-01 PROCEDURE — 63600175 PHARM REV CODE 636 W HCPCS: Performed by: NURSE ANESTHETIST, CERTIFIED REGISTERED

## 2020-09-01 PROCEDURE — 88305 TISSUE EXAM BY PATHOLOGIST: CPT | Mod: 26,,, | Performed by: PATHOLOGY

## 2020-09-01 PROCEDURE — 36000708 HC OR TIME LEV III 1ST 15 MIN: Performed by: OBSTETRICS & GYNECOLOGY

## 2020-09-01 PROCEDURE — 81025 URINE PREGNANCY TEST: CPT | Performed by: SPECIALIST

## 2020-09-01 PROCEDURE — 58563 HYSTEROSCOPY ABLATION: CPT | Mod: ,,, | Performed by: OBSTETRICS & GYNECOLOGY

## 2020-09-01 PROCEDURE — 88305 TISSUE EXAM BY PATHOLOGIST: ICD-10-PCS | Mod: 26,,, | Performed by: PATHOLOGY

## 2020-09-01 PROCEDURE — 49320 PR LAP,DIAGNOSTIC ABDOMEN: ICD-10-PCS | Mod: 82,,, | Performed by: STUDENT IN AN ORGANIZED HEALTH CARE EDUCATION/TRAINING PROGRAM

## 2020-09-01 PROCEDURE — 58563 PR HYSTEROSCOPY,W/ENDOMETRIAL ABLATION: ICD-10-PCS | Mod: ,,, | Performed by: OBSTETRICS & GYNECOLOGY

## 2020-09-01 PROCEDURE — 63600175 PHARM REV CODE 636 W HCPCS: Performed by: SPECIALIST

## 2020-09-01 PROCEDURE — 49320 DIAG LAPARO SEPARATE PROC: CPT | Mod: 59,,, | Performed by: OBSTETRICS & GYNECOLOGY

## 2020-09-01 PROCEDURE — C1782 MORCELLATOR: HCPCS | Performed by: OBSTETRICS & GYNECOLOGY

## 2020-09-01 PROCEDURE — 37000009 HC ANESTHESIA EA ADD 15 MINS: Performed by: OBSTETRICS & GYNECOLOGY

## 2020-09-01 PROCEDURE — 49320 DIAG LAPARO SEPARATE PROC: CPT | Mod: 82,,, | Performed by: STUDENT IN AN ORGANIZED HEALTH CARE EDUCATION/TRAINING PROGRAM

## 2020-09-01 PROCEDURE — 25000003 PHARM REV CODE 250: Performed by: OBSTETRICS & GYNECOLOGY

## 2020-09-01 PROCEDURE — 71000016 HC POSTOP RECOV ADDL HR: Performed by: OBSTETRICS & GYNECOLOGY

## 2020-09-01 RX ORDER — ACETAMINOPHEN 500 MG
1000 TABLET ORAL
Status: COMPLETED | OUTPATIENT
Start: 2020-09-01 | End: 2020-09-01

## 2020-09-01 RX ORDER — HYDROCODONE BITARTRATE AND ACETAMINOPHEN 5; 325 MG/1; MG/1
1 TABLET ORAL EVERY 4 HOURS PRN
Status: DISCONTINUED | OUTPATIENT
Start: 2020-09-01 | End: 2020-09-01 | Stop reason: HOSPADM

## 2020-09-01 RX ORDER — IBUPROFEN 800 MG/1
800 TABLET ORAL EVERY 8 HOURS PRN
Qty: 20 TABLET | Refills: 0 | Status: SHIPPED | OUTPATIENT
Start: 2020-09-01 | End: 2022-01-11

## 2020-09-01 RX ORDER — KETOROLAC TROMETHAMINE 30 MG/ML
INJECTION, SOLUTION INTRAMUSCULAR; INTRAVENOUS
Status: DISCONTINUED | OUTPATIENT
Start: 2020-09-01 | End: 2020-09-01

## 2020-09-01 RX ORDER — SODIUM CHLORIDE, SODIUM LACTATE, POTASSIUM CHLORIDE, CALCIUM CHLORIDE 600; 310; 30; 20 MG/100ML; MG/100ML; MG/100ML; MG/100ML
INJECTION, SOLUTION INTRAVENOUS CONTINUOUS
Status: DISCONTINUED | OUTPATIENT
Start: 2020-09-01 | End: 2020-09-01 | Stop reason: HOSPADM

## 2020-09-01 RX ORDER — FENTANYL CITRATE 50 UG/ML
INJECTION, SOLUTION INTRAMUSCULAR; INTRAVENOUS
Status: DISCONTINUED | OUTPATIENT
Start: 2020-09-01 | End: 2020-09-01

## 2020-09-01 RX ORDER — EPHEDRINE SULFATE 50 MG/ML
INJECTION, SOLUTION INTRAVENOUS
Status: DISCONTINUED | OUTPATIENT
Start: 2020-09-01 | End: 2020-09-01

## 2020-09-01 RX ORDER — MEPERIDINE HYDROCHLORIDE 25 MG/ML
12.5 INJECTION INTRAMUSCULAR; INTRAVENOUS; SUBCUTANEOUS ONCE AS NEEDED
Status: COMPLETED | OUTPATIENT
Start: 2020-09-01 | End: 2020-09-01

## 2020-09-01 RX ORDER — ONDANSETRON 2 MG/ML
INJECTION INTRAMUSCULAR; INTRAVENOUS
Status: DISCONTINUED | OUTPATIENT
Start: 2020-09-01 | End: 2020-09-01

## 2020-09-01 RX ORDER — ROCURONIUM BROMIDE 10 MG/ML
INJECTION, SOLUTION INTRAVENOUS
Status: DISCONTINUED | OUTPATIENT
Start: 2020-09-01 | End: 2020-09-01

## 2020-09-01 RX ORDER — ONDANSETRON 2 MG/ML
4 INJECTION INTRAMUSCULAR; INTRAVENOUS DAILY PRN
Status: DISCONTINUED | OUTPATIENT
Start: 2020-09-01 | End: 2020-09-01 | Stop reason: HOSPADM

## 2020-09-01 RX ORDER — MIDAZOLAM HYDROCHLORIDE 1 MG/ML
INJECTION INTRAMUSCULAR; INTRAVENOUS
Status: DISCONTINUED | OUTPATIENT
Start: 2020-09-01 | End: 2020-09-01

## 2020-09-01 RX ORDER — DIPHENHYDRAMINE HCL 25 MG
25 CAPSULE ORAL EVERY 4 HOURS PRN
Status: DISCONTINUED | OUTPATIENT
Start: 2020-09-01 | End: 2020-09-01 | Stop reason: HOSPADM

## 2020-09-01 RX ORDER — DIPHENHYDRAMINE HYDROCHLORIDE 50 MG/ML
25 INJECTION INTRAMUSCULAR; INTRAVENOUS EVERY 6 HOURS PRN
Status: DISCONTINUED | OUTPATIENT
Start: 2020-09-01 | End: 2020-09-01 | Stop reason: HOSPADM

## 2020-09-01 RX ORDER — LIDOCAINE HYDROCHLORIDE 10 MG/ML
INJECTION, SOLUTION INTRAVENOUS
Status: DISCONTINUED | OUTPATIENT
Start: 2020-09-01 | End: 2020-09-01

## 2020-09-01 RX ORDER — OXYCODONE AND ACETAMINOPHEN 5; 325 MG/1; MG/1
1 TABLET ORAL EVERY 4 HOURS PRN
Qty: 14 TABLET | Refills: 0 | Status: SHIPPED | OUTPATIENT
Start: 2020-09-01 | End: 2022-01-11

## 2020-09-01 RX ORDER — ONDANSETRON 8 MG/1
8 TABLET, ORALLY DISINTEGRATING ORAL EVERY 8 HOURS PRN
Status: DISCONTINUED | OUTPATIENT
Start: 2020-09-01 | End: 2020-09-01 | Stop reason: HOSPADM

## 2020-09-01 RX ORDER — SODIUM CHLORIDE 9 MG/ML
INJECTION, SOLUTION INTRAVENOUS CONTINUOUS
Status: ACTIVE | OUTPATIENT
Start: 2020-09-01

## 2020-09-01 RX ORDER — MUPIROCIN 20 MG/G
OINTMENT TOPICAL
Status: DISPENSED | OUTPATIENT
Start: 2020-09-01

## 2020-09-01 RX ORDER — DEXAMETHASONE SODIUM PHOSPHATE 4 MG/ML
INJECTION, SOLUTION INTRA-ARTICULAR; INTRALESIONAL; INTRAMUSCULAR; INTRAVENOUS; SOFT TISSUE
Status: DISCONTINUED | OUTPATIENT
Start: 2020-09-01 | End: 2020-09-01

## 2020-09-01 RX ORDER — GLYCOPYRROLATE 0.2 MG/ML
INJECTION INTRAMUSCULAR; INTRAVENOUS
Status: DISCONTINUED | OUTPATIENT
Start: 2020-09-01 | End: 2020-09-01

## 2020-09-01 RX ORDER — FAMOTIDINE 20 MG/1
20 TABLET, FILM COATED ORAL
Status: COMPLETED | OUTPATIENT
Start: 2020-09-01 | End: 2020-09-01

## 2020-09-01 RX ORDER — DIPHENHYDRAMINE HYDROCHLORIDE 50 MG/ML
25 INJECTION INTRAMUSCULAR; INTRAVENOUS EVERY 4 HOURS PRN
Status: DISCONTINUED | OUTPATIENT
Start: 2020-09-01 | End: 2020-09-01 | Stop reason: HOSPADM

## 2020-09-01 RX ORDER — PROPOFOL 10 MG/ML
VIAL (ML) INTRAVENOUS
Status: DISCONTINUED | OUTPATIENT
Start: 2020-09-01 | End: 2020-09-01

## 2020-09-01 RX ORDER — HYDROMORPHONE HYDROCHLORIDE 2 MG/ML
0.4 INJECTION, SOLUTION INTRAMUSCULAR; INTRAVENOUS; SUBCUTANEOUS EVERY 5 MIN PRN
Status: DISCONTINUED | OUTPATIENT
Start: 2020-09-01 | End: 2020-09-01 | Stop reason: HOSPADM

## 2020-09-01 RX ORDER — LIDOCAINE HYDROCHLORIDE 10 MG/ML
0.5 INJECTION, SOLUTION EPIDURAL; INFILTRATION; INTRACAUDAL; PERINEURAL ONCE
Status: DISCONTINUED | OUTPATIENT
Start: 2020-09-01 | End: 2020-09-01 | Stop reason: HOSPADM

## 2020-09-01 RX ORDER — SODIUM CHLORIDE 0.9 % (FLUSH) 0.9 %
3 SYRINGE (ML) INJECTION
Status: DISCONTINUED | OUTPATIENT
Start: 2020-09-01 | End: 2020-09-01 | Stop reason: HOSPADM

## 2020-09-01 RX ORDER — NEOSTIGMINE METHYLSULFATE 1 MG/ML
INJECTION, SOLUTION INTRAVENOUS
Status: DISCONTINUED | OUTPATIENT
Start: 2020-09-01 | End: 2020-09-01

## 2020-09-01 RX ORDER — OXYCODONE HYDROCHLORIDE 5 MG/1
5 TABLET ORAL
Status: DISCONTINUED | OUTPATIENT
Start: 2020-09-01 | End: 2020-09-01 | Stop reason: HOSPADM

## 2020-09-01 RX ADMIN — HYDROMORPHONE HYDROCHLORIDE 0.4 MG: 2 INJECTION INTRAMUSCULAR; INTRAVENOUS; SUBCUTANEOUS at 08:09

## 2020-09-01 RX ADMIN — DEXAMETHASONE SODIUM PHOSPHATE 8 MG: 4 INJECTION, SOLUTION INTRAMUSCULAR; INTRAVENOUS at 07:09

## 2020-09-01 RX ADMIN — PROPOFOL 150 MG: 10 INJECTION, EMULSION INTRAVENOUS at 06:09

## 2020-09-01 RX ADMIN — ONDANSETRON 8 MG: 8 TABLET, ORALLY DISINTEGRATING ORAL at 10:09

## 2020-09-01 RX ADMIN — EPHEDRINE SULFATE 15 MG: 50 INJECTION INTRAVENOUS at 07:09

## 2020-09-01 RX ADMIN — MUPIROCIN: 20 OINTMENT TOPICAL at 06:09

## 2020-09-01 RX ADMIN — FAMOTIDINE 20 MG: 20 TABLET, FILM COATED ORAL at 06:09

## 2020-09-01 RX ADMIN — SODIUM CHLORIDE, SODIUM LACTATE, POTASSIUM CHLORIDE, AND CALCIUM CHLORIDE: 600; 310; 30; 20 INJECTION, SOLUTION INTRAVENOUS at 06:09

## 2020-09-01 RX ADMIN — GLYCOPYRROLATE 0.8 MG: 0.2 INJECTION, SOLUTION INTRAMUSCULAR; INTRAVENOUS at 07:09

## 2020-09-01 RX ADMIN — CARBOXYMETHYLCELLULOSE SODIUM 2 DROP: 2.5 SOLUTION/ DROPS OPHTHALMIC at 06:09

## 2020-09-01 RX ADMIN — EPHEDRINE SULFATE 20 MG: 50 INJECTION INTRAVENOUS at 07:09

## 2020-09-01 RX ADMIN — DIPHENHYDRAMINE HYDROCHLORIDE 25 MG: 25 CAPSULE ORAL at 10:09

## 2020-09-01 RX ADMIN — FENTANYL CITRATE 100 MCG: 50 INJECTION, SOLUTION INTRAMUSCULAR; INTRAVENOUS at 06:09

## 2020-09-01 RX ADMIN — ROCURONIUM BROMIDE 40 MG: 10 INJECTION, SOLUTION INTRAVENOUS at 06:09

## 2020-09-01 RX ADMIN — MEPERIDINE HYDROCHLORIDE 12.5 MG: 25 INJECTION INTRAMUSCULAR; INTRAVENOUS; SUBCUTANEOUS at 08:09

## 2020-09-01 RX ADMIN — ACETAMINOPHEN 1000 MG: 500 TABLET, FILM COATED ORAL at 06:09

## 2020-09-01 RX ADMIN — MIDAZOLAM HYDROCHLORIDE 2 MG: 1 INJECTION, SOLUTION INTRAMUSCULAR; INTRAVENOUS at 06:09

## 2020-09-01 RX ADMIN — KETOROLAC TROMETHAMINE 30 MG: 30 INJECTION, SOLUTION INTRAMUSCULAR; INTRAVENOUS at 07:09

## 2020-09-01 RX ADMIN — LIDOCAINE HYDROCHLORIDE 100 MG: 10 INJECTION, SOLUTION INTRAVENOUS at 06:09

## 2020-09-01 RX ADMIN — SODIUM CHLORIDE, SODIUM LACTATE, POTASSIUM CHLORIDE, AND CALCIUM CHLORIDE: 600; 310; 30; 20 INJECTION, SOLUTION INTRAVENOUS at 07:09

## 2020-09-01 RX ADMIN — ONDANSETRON HYDROCHLORIDE 4 MG: 2 INJECTION INTRAMUSCULAR; INTRAVENOUS at 07:09

## 2020-09-01 RX ADMIN — NEOSTIGMINE METHYLSULFATE 5 MG: 1 INJECTION INTRAVENOUS at 07:09

## 2020-09-01 NOTE — OP NOTE
[facility]  Operative Note    SUMMARY     Date of Procedure: 2020     Procedure: Procedure(s) (LRB):  HYSTEROSCOPY (N/A)  LAPAROSCOPY, DIAGNOSTIC (N/A)  ABLATION, ENDOMETRIUM, THERMAL (N/A)       Surgeon: Rebeca Yoon M.D.    Assistant: Cole Leon M.D.      Pre-Operative Diagnosis:   AUB  Pelvic pain in female [R10.2]    Post-Operative Diagnosis: Post-Op Diagnosis Codes:  AUB     * Endometrial polyp [N84.0]     * Pelvic pain in female [R10.2]    Anesthesia: General    Technical Procedures Used: Hysteroscopy D&C, Novasure ablation, Diagnostic Laparoscopy    Indications: 49 y.o.  with heavier menses for the past year and RLQ pain during menses.     Findings: Normal appearing uterus, bilateral tubes (with evidence of previous tubal ligation) and ovaries. Normal appearing appendix. Dense bands of possibly scar tissue spider-webbed in the right mesosalpinx. Right adnexa and right sided bowel are all free with no adhesions limiting movement.    Description of the Findings of the Procedure: Patient was taken to the operating room where general anesthesia was performed and found to be adequate. She was prepped and draped in the normal sterile fashion in the dorsal lithotomy position in [facility]. Bladder was drained with straight catheter. The Bita dilators were used to dilate to #7. The hysteroscope was introduced and endometrial polyp arising from left sidewall noted. Myosure inserted and polyp easily removed. Normal endometrium and tubal ostia were visualized. The hysteroscope was removed. Sharp currettage was performed and endometrial sample sent to pathology. The total uterine length was 5 cm and cervical length 4 cm. The width was 4.2 cm. The Novasure device was tested, and after cavity integrity was confirmed, the device was activated in the usual fashion. After the procedure was complete the device was removed. The hysteroscope was re-introduced and the  uterine cavity appeared appropriately blanched and charred.         Attention was then turned abdomen, and a 5 mm skin incision was made with a knife. Towel clamps were used to elevate the abdomen. The veress needle was used to enter the abdomen without difficulty with a starting pressure of 4 mm Hg. The abdomen was then insufflated to 15 mmHg. A 5 mm bladeless trocar was then placed  without difficulty. The patient was then placed in trendelenburg position. A 5 mm bladeless trocar was placed in the LLQ in the usual fashion.  The abdomen was then evaluated and the findings were as noted above.      The trocars were then removed. The skin incisions were closed using 4-0 Monocryl. The instruments were removed from the vagina with excellent hemostasis and no active bleeding was noted. The patient tolerated the procedure well. Sponge lap and needle counts correct x 2.     Complications: No    Estimated Blood Loss (EBL): * No values recorded between 9/1/2020  7:17 AM and 9/1/2020  8:04 AM *           Implants: * No implants in log *    Specimens:   Specimen (12h ago, onward)    None                  Condition: Good    Disposition: PACU - hemodynamically stable.    Attestation: There was no qualified resident available for this procedure.

## 2020-09-01 NOTE — INTERVAL H&P NOTE
The patient has been examined and the H&P has been reviewed:    I concur with the findings and no changes have occurred since H&P was written.    Surgery risks, benefits and alternative options discussed and understood by patient/family.          Active Hospital Problems    Diagnosis  POA    Abnormal uterine bleeding (AUB) [N93.9]  Yes      Resolved Hospital Problems   No resolved problems to display.

## 2020-09-01 NOTE — PLAN OF CARE
Clara Mills has met all discharge criteria from Phase II. Vital Signs are stable, ambulating  without difficulty. Pt voided. Discharge instructions given, patient verbalized understanding. Discharged from facility via wheelchair in stable condition.

## 2020-09-01 NOTE — DISCHARGE SUMMARY
[facility]  Discharge Summary  OBGYN    Admission date: 9/1/2020  Discharge date: 09/01/2020    Admit Dx:      Patient Active Problem List   Diagnosis    Gilbert's syndrome    ADD (attention deficit disorder)    HSV infection    Presbyopia    Hypermetropia of both eyes    Abnormal uterine bleeding (AUB)     Discharge Dx:    Patient Active Problem List   Diagnosis    Gilbert's syndrome    ADD (attention deficit disorder)    HSV infection    Presbyopia    Hypermetropia of both eyes    Abnormal uterine bleeding (AUB)       Attending Physician: Rebeca Yoon   Discharge Provider: Rebeca Yoon    Procedures Performed: Procedure(s) (LRB):  HYSTEROSCOPY (N/A)  LAPAROSCOPY, DIAGNOSTIC (N/A)  ABLATION, ENDOMETRIUM, THERMAL (N/A)    Hospital Course: Patient was admitted on 9/1/2020 to undergo laparoscopy and endometrial ablation secondary to AUB and pelvic pain.  Procedure was uncomplicated, for full details see operative report. Barring any unforseen incidents, patient will be discharged home when she is able to ambulate, void, and tolerate PO intake.    Consults: none    Significant Diagnostic Studies:   Lab Results   Component Value Date    WBC 6.29 08/25/2020    HGB 12.6 08/25/2020    HCT 37.8 08/25/2020    MCV 94 08/25/2020     08/25/2020     US Pelvis Comp with Transvag NON-OB (xpd  Narrative: EXAMINATION:  US PELVIS COMP WITH TRANSVAG NON-OB (XPD)    CLINICAL HISTORY:  Excessive and frequent menstruation with regular cycle    TECHNIQUE:  Transabdominal sonography of the pelvis was performed, followed by transvaginal sonography to better evaluate the uterus and ovaries.    COMPARISON:  None.    FINDINGS:  Uterus:    Size: 8.5 cm x 4.5 cm x 5.6 cm    Masses: None    Endometrium: The endometrium measures 13 mm.  On the posterior aspect of the body of the endometrial there is a 1.6 cm x 8 mm x 1.7 cm circumscribed, oval echogenic focus.  No definitive associated  vascular supply.    Right ovary:    Size: 2.1 cm x 1 cm x 1.9 cm    Appearance: Normal    Vascular flow: Normal.    Left ovary:    Size: 1.9 cm x 2 cm x 1.4 cm    Appearance: Normal    Vascular Flow: Normal.    Free Fluid:    None.  Impression: Today's findings are concerning for an endometrial polyp.  If deemed clinically indicated, tissue sampling may be warranted.    Electronically signed by: China Gamez MD  Date:    04/22/2020  Time:    14:29      Discharged Condition: stable    Disposition: Home    Follow Up:   Follow-up Information     Rebeca Yoon MD In 2 weeks.    Specialty: Obstetrics and Gynecology  Why: For Routine Post Operative Care  Contact information:  [address] [id number] [phone]                   Medications:  Discharge Medication List as of 9/1/2020  8:46 AM      START taking these medications    Details   oxyCODONE-acetaminophen (PERCOCET) 5-325 mg per tablet Take 1 tablet by mouth every 4 (four) hours as needed for Pain., Starting Tue 9/1/2020, Normal         CONTINUE these medications which have CHANGED    Details   ibuprofen (ADVIL,MOTRIN) 800 MG tablet Take 1 tablet (800 mg total) by mouth every 8 (eight) hours as needed for Pain., Starting Tue 9/1/2020, Normal             Discharge Procedure Orders   Diet general     Pelvic Rest   Order Comments: X 2 weeks     Call MD for:  temperature >100.4     Call MD for:  severe uncontrolled pain     Call MD for:   Order Comments: Vaginal bleeding >2pads/hour for >2 hours

## 2020-09-01 NOTE — OPERATIVE NOTE ADDENDUM
Certification of Assistant at Surgery       Surgery Date: 9/1/2020     Participating Surgeons:  Surgeon(s) and Role:     * Rebeca Yoon MD - Primary     * Carolyn Garcia MD - Assisting    Procedures:  Procedure(s) (LRB):  HYSTEROSCOPY (N/A)  LAPAROSCOPY, DIAGNOSTIC (N/A)  ABLATION, ENDOMETRIUM, THERMAL (N/A)    Assistant Surgeon's Certification of Necessity:  I understand that section 1842 (b) (6) (d) of the Social Security Act generally prohibits Medicare Part B reasonable charge payment for the services of assistants at surgery in teaching hospitals when qualified residents are available to furnish such services. I certify that the services for which payment is claimed were medically necessary, and that no qualified resident was available to perform the services. I further understand that these services are subject to post-payment review by the Medicare carrier.      Carolyn Garcia MD    09/01/2020  4:41 PM

## 2020-09-01 NOTE — ANESTHESIA POSTPROCEDURE EVALUATION
Anesthesia Post Evaluation    Patient: Clara Mills    Procedure(s) Performed: Procedure(s) (LRB):  HYSTEROSCOPY (N/A)  LAPAROSCOPY, DIAGNOSTIC (N/A)  ABLATION, ENDOMETRIUM, THERMAL (N/A)    Final Anesthesia Type: general    Patient location during evaluation: PACU  Patient participation: Yes- Able to Participate  Level of consciousness: awake and alert  Post-procedure vital signs: reviewed and stable  Pain management: adequate  Airway patency: patent  KAROLINA mitigation strategies: Extubation while patient is awake  PONV status at discharge: No PONV  Anesthetic complications: no      Cardiovascular status: hemodynamically stable  Respiratory status: unassisted  Hydration status: euvolemic  Follow-up not needed.          Vitals Value Taken Time   /62 09/01/20 0950   Temp 36.6 °C (97.8 °F) 09/01/20 0850   Pulse 68 09/01/20 0950   Resp 16 09/01/20 0950   SpO2 98 % 09/01/20 0950         Event Time   Out of Recovery 08:45:40         Pain/Joanne Score: Pain Rating Prior to Med Admin: 5 (9/1/2020  8:26 AM)  Pain Rating Post Med Admin: 4 (states tolerable) (9/1/2020  8:31 AM)  Joanne Score: 10 (9/1/2020  9:50 AM)

## 2020-09-01 NOTE — TRANSFER OF CARE
"Anesthesia Transfer of Care Note    Patient: Clara Mills    Procedure(s) Performed: Procedure(s) (LRB):  HYSTEROSCOPY (N/A)  LAPAROSCOPY, DIAGNOSTIC (N/A)  ABLATION, ENDOMETRIUM, THERMAL (N/A)    Patient location: PACU    Anesthesia Type: general    Transport from OR: Transported from OR on 2-3 L/min O2 by NC with adequate spontaneous ventilation    Post pain: adequate analgesia    Post assessment: no apparent anesthetic complications    Post vital signs: stable    Level of consciousness: awake, alert and oriented    Nausea/Vomiting: no nausea/vomiting    Complications: none    Transfer of care protocol was followed      Last vitals:   Visit Vitals  /72 (BP Location: Right arm, Patient Position: Lying)   Pulse 79   Temp 36.3 °C (97.4 °F) (Oral)   Resp 16   Ht 5' 4" (1.626 m)   Wt 64 kg (141 lb 1.5 oz)   LMP 08/29/2020   SpO2 100%   Breastfeeding No   BMI 24.22 kg/m²     "

## 2020-09-01 NOTE — DISCHARGE INSTRUCTIONS
Abdominal Laparoscopy Discharge Instructions      Activity  · Limit your activity for 4-6 weeks.  · Dont lift anything heavier than 5-10 pounds.  · Avoid strenuous activities, such as mowing the lawn, vacuuming, or playing sports.  · Limit your activity to short, slow walks. Gradually increase your pace and distance as you feel able.  · Listen to your body. If an activity causes pain, stop.  · Rest when you are tired.  · Dont have sexual intercourse or use tampons or douches until your doctor says its safe to do so.    Home Care  · Always keep your incision clean and dry  · Shower as instructed in 24 hours. You may wash your incision gently with mild soap and warm water and pat dry.  Avoid tub baths, hot tubs and swimming pools until seen by your physician for a post-op follow up.  · If you have steri strips they should fall off in 7-10 days.    · If you have band aids covering your incisions change daily or when soiled.  The band aids may be removed post op day 1 if they are clean and dry.  · Take your medication exactly as instructed by your doctor.  · Return to your diet as you feel able. Eat a healthy, well-balanced diet.  · Avoid constipation.  ¨ Use laxatives, stool softeners, or enemas as directed by your doctor.  ¨ Eat more high-fiber foods.  ¨ Drink 6-8 glasses of water every day, unless directed otherwise.  Follow-Up  Make a follow-up appointment as directed by our staff.       When to Call Your Doctor  Call your doctor right away if you have any of the following:  · Fever above 101.5°F  (38.6°C) or chills  · Bright red vaginal bleeding or a foul-smelling discharge  · Vaginal bleeding that soaks more than one sanitary pad per hour  · Trouble urinating or burning sensation when you urinate  · Severe abdominal pain or bloating  · Redness, swelling, or drainage at your incision site  · Shortness of breath        ________________________________________________________________  FOR EMERGENCIES:  If any  unusual problems or difficulties occur, contact                 ________________________ or the resident at [phone] (page ) or at the Clinic office, [phone].

## 2020-09-01 NOTE — ANESTHESIA PROCEDURE NOTES
Intubation  Performed by: Tamika Paula CRNA  Authorized by: Caio Yoder MD     Intubation:     Induction:  Intravenous    Intubated:  Postinduction    Mask Ventilation:  Easy mask    Attempts:  1    Attempted By:  CRNA    Method of Intubation:  Direct and video laryngoscopy    Blade:  Luiz 3    Laryngeal View Grade: Grade I - full view of chords      Difficult Airway Encountered?: No      Complications:  None    Airway Device:  Oral endotracheal tube    Airway Device Size:  7.0    Style/Cuff Inflation:  Cuffed    Inflation Amount (mL):  3    Tube secured:  20    Secured at:  The lips    Placement Verified By:  Capnometry    Complicating Factors:  None    Findings Post-Intubation:  BS equal bilateral

## 2020-09-03 LAB
FINAL PATHOLOGIC DIAGNOSIS: NORMAL
GROSS: NORMAL

## 2020-09-14 NOTE — PROGRESS NOTES
"  Subjective:       Clara Mills is a 49 y.o. [id number] presents to the clinic 2 weeks status post laparoscopy and endometrial ablation for abnormal uterine bleeding and pelvic pain. Eating a regular diet without difficulty. Bowel movements are normal. The patient is not having any pain. Having some very light brown watery discharge.    The patient's allergies, and past medical and surgical histories were reviewed.    Review of Systems  Pertinent items are noted in HPI.      Objective:      /60   Ht 5' 4" (1.626 m)   Wt 64.3 kg (141 lb 12.1 oz)   LMP 2020 (Exact Date)   BMI 24.33 kg/m²   General:  alert, cooperative and no distress   Abdomen: soft, non-tender   Incision:       healing well, no drainage, no erythema, no hernia, no seroma, no swelling, no dehiscence, incision well approximated        Pathology:  1.  FRAGMENTED BENIGN ENDOMETRIAL POLYP SHOWING SECRETORY TYPE ENDOMETRIUM   2.  SMALL FRAGMENTS OF INACTIVE ENDOMETRIUM WITH STROMAL BREAKDOWN AND BLOOD   CLOT ARE PRESENT.  SEVERAL TISSUE FRAGMENTS SHOW CHANGES SIMILAR TO THAT SEEN   IN SPECIMEN 1.        Assessment:      Doing well postoperatively.  Operative findings again reviewed. Pathology report discussed.      Plan:      1. Continue any current medications.  2. Wound care discussed.  3. Activity restrictions: none  4. Anticipated return to work: not applicable.  5. Follow up:for annual exam in 3 months   "

## 2020-09-15 ENCOUNTER — OFFICE VISIT (OUTPATIENT)
Dept: OBSTETRICS AND GYNECOLOGY | Facility: CLINIC | Age: 50
End: 2020-09-15
Payer: COMMERCIAL

## 2020-09-15 VITALS
DIASTOLIC BLOOD PRESSURE: 60 MMHG | HEIGHT: 64 IN | WEIGHT: 141.75 LBS | BODY MASS INDEX: 24.2 KG/M2 | SYSTOLIC BLOOD PRESSURE: 100 MMHG

## 2020-09-15 DIAGNOSIS — Z98.890 POST-OPERATIVE STATE: Primary | ICD-10-CM

## 2020-09-15 DIAGNOSIS — Z12.31 VISIT FOR SCREENING MAMMOGRAM: ICD-10-CM

## 2020-09-15 PROCEDURE — 99024 PR POST-OP FOLLOW-UP VISIT: ICD-10-PCS | Mod: S$GLB,,, | Performed by: OBSTETRICS & GYNECOLOGY

## 2020-09-15 PROCEDURE — 99999 PR PBB SHADOW E&M-EST. PATIENT-LVL III: CPT | Mod: PBBFAC,,, | Performed by: OBSTETRICS & GYNECOLOGY

## 2020-09-15 PROCEDURE — 99024 POSTOP FOLLOW-UP VISIT: CPT | Mod: S$GLB,,, | Performed by: OBSTETRICS & GYNECOLOGY

## 2020-09-15 PROCEDURE — 99999 PR PBB SHADOW E&M-EST. PATIENT-LVL III: ICD-10-PCS | Mod: PBBFAC,,, | Performed by: OBSTETRICS & GYNECOLOGY

## 2020-09-30 ENCOUNTER — PATIENT MESSAGE (OUTPATIENT)
Dept: DERMATOLOGY | Facility: CLINIC | Age: 50
End: 2020-09-30

## 2020-10-01 ENCOUNTER — OFFICE VISIT (OUTPATIENT)
Dept: DERMATOLOGY | Facility: CLINIC | Age: 50
End: 2020-10-01
Payer: COMMERCIAL

## 2020-10-01 VITALS — TEMPERATURE: 98 F

## 2020-10-01 DIAGNOSIS — D18.01 ANGIOMA OF SKIN: ICD-10-CM

## 2020-10-01 DIAGNOSIS — D23.72: ICD-10-CM

## 2020-10-01 DIAGNOSIS — L81.4 LENTIGINES: ICD-10-CM

## 2020-10-01 DIAGNOSIS — D22.71 MELANOCYTIC NEVUS OF RIGHT LOWER EXTREMITY: ICD-10-CM

## 2020-10-01 DIAGNOSIS — L57.0 ACTINIC KERATOSIS: Primary | ICD-10-CM

## 2020-10-01 DIAGNOSIS — H00.015 HORDEOLUM EXTERNUM OF LEFT LOWER EYELID: ICD-10-CM

## 2020-10-01 PROCEDURE — 99203 OFFICE O/P NEW LOW 30 MIN: CPT | Mod: 25,S$GLB,, | Performed by: DERMATOLOGY

## 2020-10-01 PROCEDURE — 17000 PR DESTRUCTION(LASER SURGERY,CRYOSURGERY,CHEMOSURGERY),PREMALIGNANT LESIONS,FIRST LESION: ICD-10-PCS | Mod: S$GLB,,, | Performed by: DERMATOLOGY

## 2020-10-01 PROCEDURE — 99203 PR OFFICE/OUTPT VISIT, NEW, LEVL III, 30-44 MIN: ICD-10-PCS | Mod: 25,S$GLB,, | Performed by: DERMATOLOGY

## 2020-10-01 PROCEDURE — 17003 DESTRUCT PREMALG LES 2-14: CPT | Mod: S$GLB,,, | Performed by: DERMATOLOGY

## 2020-10-01 PROCEDURE — 17003 DESTRUCTION, PREMALIGNANT LESIONS; SECOND THROUGH 14 LESIONS: ICD-10-PCS | Mod: S$GLB,,, | Performed by: DERMATOLOGY

## 2020-10-01 PROCEDURE — 17000 DESTRUCT PREMALG LESION: CPT | Mod: S$GLB,,, | Performed by: DERMATOLOGY

## 2020-10-01 RX ORDER — DOXYCYCLINE 100 MG/1
TABLET ORAL
Qty: 20 TABLET | Refills: 0 | Status: SHIPPED | OUTPATIENT
Start: 2020-10-01 | End: 2022-01-11

## 2020-10-01 NOTE — PROGRESS NOTES
Subjective:       Patient ID:  Clraa Mills is a 49 y.o. female who presents for   Chief Complaint   Patient presents with    Mole    Spot     Mole - Initial  Affected locations: diffuse  Signs / symptoms: asymptomatic  Severity: mild  Timing: constant  Aggravated by: nothing  Relieving factors/Treatments tried: nothing    Spot - Initial  Affected locations: left eye  Duration: 1 week  Signs / symptoms: tender, redness, itching, irritated and growing  Severity: mild to moderate  Timing: constant  Aggravated by: pressure and friction  Treatments tried: warm compress.  Improvement on treatment: moderate      Review of Systems   Constitutional: Negative for fever.   Eyes: Positive for itching.   Respiratory: Negative for cough and shortness of breath.    Musculoskeletal: Negative for joint swelling and arthralgias.   Skin: Positive for dry skin.   Hematologic/Lymphatic: Does not bruise/bleed easily.        Objective:    Physical Exam   Constitutional: She appears well-developed and well-nourished. No distress.   HENT:   Mouth/Throat: Lips normal.    Eyes: Abnormal Lids.  No conjunctival no injection.   Neurological: She is alert and oriented to person, place, and time. She is not disoriented.   Psychiatric: She has a normal mood and affect.   Skin:   Areas Examined (abnormalities noted in diagram):   Scalp / Hair Palpated and Inspected  Head / Face Inspection Performed  Neck Inspection Performed  Chest / Axilla Inspection Performed  Abdomen Inspection Performed  Genitals / Buttocks / Groin Inspection Performed  Back Inspection Performed  RUE Inspected  LUE Inspection Performed  RLE Inspected  LLE Inspection Performed  Nails and Digits Inspection Performed                   Diagram Legend     Erythematous scaling macule/papule c/w actinic keratosis       Vascular papule c/w angioma      Pigmented verrucoid papule/plaque c/w seborrheic keratosis      Yellow umbilicated papule c/w sebaceous hyperplasia       Irregularly shaped tan macule c/w lentigo     1-2 mm smooth white papules consistent with Milia      Movable subcutaneous cyst with punctum c/w epidermal inclusion cyst      Subcutaneous movable cyst c/w pilar cyst      Firm pink to brown papule c/w dermatofibroma      Pedunculated fleshy papule(s) c/w skin tag(s)      Evenly pigmented macule c/w junctional nevus     Mildly variegated pigmented, slightly irregular-bordered macule c/w mildly atypical nevus      Flesh colored to evenly pigmented papule c/w intradermal nevus       Pink pearly papule/plaque c/w basal cell carcinoma      Erythematous hyperkeratotic cursted plaque c/w SCC      Surgical scar with no sign of skin cancer recurrence      Open and closed comedones      Inflammatory papules and pustules      Verrucoid papule consistent consistent with wart     Erythematous eczematous patches and plaques     Dystrophic onycholytic nail with subungual debris c/w onychomycosis     Umbilicated papule    Erythematous-base heme-crusted tan verrucoid plaque consistent with inflamed seborrheic keratosis     Erythematous Silvery Scaling Plaque c/w Psoriasis     See annotation      Assessment / Plan:        Actinic keratosis  Cryosurgery procedure note:  Risk, benefits, and alternatives of cryosurgery are discussed with the patient, including but not limited to the risks of hypopigmentation, hyperpigmentation, scar, infection, recurrence of lesion(s), development of new lesion(s), and need for additional treatment of the lesion(s). Verbal consent obtained from patient. Liquid nitrogen cryosurgery applied to 2 lesion(s) to produce a freeze injury. Counseled patient that blisters may form, and instructed patient on wound care with gentle cleansing and use of Vaseline ointment to keep moist until healed. Handout was provided, and patient was instructed to return to clinic in 1-2 months if lesions do not completely resolve.    Hordeolum externum of left lower eyelid  -      doxycycline monohydrate 100 mg Tab; Take 1 po BID pc x 10 days. Take with food (no dairy) and with plenty water.  Dispense: 20 tablet; Refill: 0  -     ciprofloxacin HCl (CILOXAN) 0.3 % Oint; Place into the left eye 3 (three) times daily.  Dispense: 1 Tube; Refill: 0  Discussed benefits and risks of doxycyline therapy including but not limited to GI discomfort, esophageal irritation/ulceration, and increased sun sensitivity. Patient was counseled to take medicine with meals and at least 1 hour before lying down.  Continue warm compresses and trial of OcuSoft desirae scrub.  If no improvement with above, recommended that she follow up with her ophthalmologist.    Angioma of skin  This is a benign vascular lesion. Reassurance given. No treatment required.    Lentigines  These are benign sun spots which should be monitored for changes. Daily sun protection will reduce the number of new lesions.   Patient instructed in importance of daily broad-spectrum sunscreen use with SPF of at least 30. Sun avoidance and topical protection/protective clothing discussed.    Epidermal nevus of left lower extremity  Reassurance was given to the patient. No treatment is necessary.    Melanocytic nevus of right lower extremity  Benign-appearing nevus on exam today. Counseled patient to periodically examine moles and return to clinic if any changes in size, shape, or color are noted or if it becomes symptomatic (bleeding, itching, pain, etc).    Follow up if symptoms worsen or fail to improve.

## 2020-12-21 ENCOUNTER — IMMUNIZATION (OUTPATIENT)
Dept: INTERNAL MEDICINE | Facility: CLINIC | Age: 50
End: 2020-12-21
Payer: COMMERCIAL

## 2020-12-21 DIAGNOSIS — Z23 NEED FOR VACCINATION: ICD-10-CM

## 2020-12-21 PROCEDURE — 0001A COVID-19, MRNA, LNP-S, PF, 30 MCG/0.3 ML DOSE VACCINE: ICD-10-PCS | Mod: CV19,,, | Performed by: INTERNAL MEDICINE

## 2020-12-21 PROCEDURE — 91300 COVID-19, MRNA, LNP-S, PF, 30 MCG/0.3 ML DOSE VACCINE: CPT | Mod: ,,, | Performed by: INTERNAL MEDICINE

## 2020-12-21 PROCEDURE — 0001A COVID-19, MRNA, LNP-S, PF, 30 MCG/0.3 ML DOSE VACCINE: CPT | Mod: CV19,,, | Performed by: INTERNAL MEDICINE

## 2020-12-21 PROCEDURE — 91300 COVID-19, MRNA, LNP-S, PF, 30 MCG/0.3 ML DOSE VACCINE: ICD-10-PCS | Mod: ,,, | Performed by: INTERNAL MEDICINE

## 2020-12-29 ENCOUNTER — PATIENT MESSAGE (OUTPATIENT)
Dept: OBSTETRICS AND GYNECOLOGY | Facility: CLINIC | Age: 50
End: 2020-12-29

## 2021-01-11 ENCOUNTER — IMMUNIZATION (OUTPATIENT)
Dept: INTERNAL MEDICINE | Facility: CLINIC | Age: 51
End: 2021-01-11
Payer: COMMERCIAL

## 2021-01-11 DIAGNOSIS — Z23 NEED FOR VACCINATION: ICD-10-CM

## 2021-01-11 PROCEDURE — 91300 COVID-19, MRNA, LNP-S, PF, 30 MCG/0.3 ML DOSE VACCINE: CPT | Mod: PBBFAC | Performed by: INTERNAL MEDICINE

## 2021-01-11 PROCEDURE — 0002A COVID-19, MRNA, LNP-S, PF, 30 MCG/0.3 ML DOSE VACCINE: CPT | Mod: PBBFAC | Performed by: INTERNAL MEDICINE

## 2021-06-24 ENCOUNTER — PATIENT MESSAGE (OUTPATIENT)
Dept: OPTOMETRY | Facility: CLINIC | Age: 51
End: 2021-06-24

## 2021-08-17 ENCOUNTER — OFFICE VISIT (OUTPATIENT)
Dept: OPTOMETRY | Facility: CLINIC | Age: 51
End: 2021-08-17
Payer: COMMERCIAL

## 2021-08-17 DIAGNOSIS — H52.03 HYPEROPIA WITH PRESBYOPIA, BILATERAL: Primary | ICD-10-CM

## 2021-08-17 DIAGNOSIS — Z97.3 WEARS CONTACT LENSES: ICD-10-CM

## 2021-08-17 DIAGNOSIS — Z46.0 FITTING AND ADJUSTMENT OF SPECTACLES AND CONTACT LENSES: Primary | ICD-10-CM

## 2021-08-17 DIAGNOSIS — H52.4 HYPEROPIA WITH PRESBYOPIA, BILATERAL: Primary | ICD-10-CM

## 2021-08-17 PROCEDURE — 92310 CONTACT LENS FITTING OU: CPT | Mod: S$GLB,,, | Performed by: OPTOMETRIST

## 2021-08-17 PROCEDURE — 92014 COMPRE OPH EXAM EST PT 1/>: CPT | Mod: S$GLB,,, | Performed by: OPTOMETRIST

## 2021-08-17 PROCEDURE — 99999 PR PBB SHADOW E&M-EST. PATIENT-LVL II: CPT | Mod: PBBFAC,,, | Performed by: OPTOMETRIST

## 2021-08-17 PROCEDURE — 92015 PR REFRACTION: ICD-10-PCS | Mod: S$GLB,,, | Performed by: OPTOMETRIST

## 2021-08-17 PROCEDURE — 99999 PR PBB SHADOW E&M-EST. PATIENT-LVL II: ICD-10-PCS | Mod: PBBFAC,,, | Performed by: OPTOMETRIST

## 2021-08-17 PROCEDURE — 92015 DETERMINE REFRACTIVE STATE: CPT | Mod: S$GLB,,, | Performed by: OPTOMETRIST

## 2021-08-17 PROCEDURE — 92310 PR CONTACT LENS FITTING (NO CHANGE): ICD-10-PCS | Mod: S$GLB,,, | Performed by: OPTOMETRIST

## 2021-08-17 PROCEDURE — 92014 PR EYE EXAM, EST PATIENT,COMPREHESV: ICD-10-PCS | Mod: S$GLB,,, | Performed by: OPTOMETRIST

## 2021-10-19 ENCOUNTER — TELEPHONE (OUTPATIENT)
Dept: OBSTETRICS AND GYNECOLOGY | Facility: CLINIC | Age: 51
End: 2021-10-19
Payer: COMMERCIAL

## 2021-10-19 ENCOUNTER — PATIENT MESSAGE (OUTPATIENT)
Dept: OBSTETRICS AND GYNECOLOGY | Facility: CLINIC | Age: 51
End: 2021-10-19

## 2021-10-19 DIAGNOSIS — Z12.31 BREAST CANCER SCREENING BY MAMMOGRAM: Primary | ICD-10-CM

## 2021-12-01 ENCOUNTER — HOSPITAL ENCOUNTER (OUTPATIENT)
Dept: RADIOLOGY | Facility: HOSPITAL | Age: 51
Discharge: HOME | End: 2021-12-01
Attending: OBSTETRICS & GYNECOLOGY
Payer: COMMERCIAL

## 2021-12-01 DIAGNOSIS — Z12.31 BREAST CANCER SCREENING BY MAMMOGRAM: ICD-10-CM

## 2021-12-01 PROCEDURE — 77063 MAMMO DIGITAL SCREENING BILAT WITH TOMO: ICD-10-PCS | Mod: 26,,, | Performed by: RADIOLOGY

## 2021-12-01 PROCEDURE — 77067 SCR MAMMO BI INCL CAD: CPT | Mod: TC

## 2021-12-01 PROCEDURE — 77063 BREAST TOMOSYNTHESIS BI: CPT | Mod: 26,,, | Performed by: RADIOLOGY

## 2021-12-01 PROCEDURE — 77067 SCR MAMMO BI INCL CAD: CPT | Mod: 26,,, | Performed by: RADIOLOGY

## 2021-12-01 PROCEDURE — 77067 MAMMO DIGITAL SCREENING BILAT WITH TOMO: ICD-10-PCS | Mod: 26,,, | Performed by: RADIOLOGY

## 2021-12-05 ENCOUNTER — IMMUNIZATION (OUTPATIENT)
Dept: PRIMARY CARE CLINIC | Facility: CLINIC | Age: 51
End: 2021-12-05
Payer: COMMERCIAL

## 2021-12-05 DIAGNOSIS — Z23 NEED FOR VACCINATION: Primary | ICD-10-CM

## 2021-12-05 PROCEDURE — 0004A COVID-19, MRNA, LNP-S, PF, 30 MCG/0.3 ML DOSE VACCINE: CPT | Mod: CV19,PBBFAC | Performed by: INTERNAL MEDICINE

## 2021-12-06 ENCOUNTER — TELEPHONE (OUTPATIENT)
Dept: RADIOLOGY | Facility: HOSPITAL | Age: 51
End: 2021-12-06
Payer: COMMERCIAL

## 2021-12-06 ENCOUNTER — PATIENT MESSAGE (OUTPATIENT)
Dept: RADIOLOGY | Facility: HOSPITAL | Age: 51
End: 2021-12-06
Payer: COMMERCIAL

## 2021-12-09 ENCOUNTER — HOSPITAL ENCOUNTER (OUTPATIENT)
Dept: RADIOLOGY | Facility: OTHER | Age: 51
Discharge: HOME | End: 2021-12-09
Attending: OBSTETRICS & GYNECOLOGY
Payer: COMMERCIAL

## 2021-12-09 DIAGNOSIS — R92.8 ABNORMAL FINDING ON BREAST IMAGING: ICD-10-CM

## 2021-12-09 PROCEDURE — 76642 ULTRASOUND BREAST LIMITED: CPT | Mod: TC,50

## 2021-12-09 PROCEDURE — 76642 US BREAST BILATERAL LIMITED: ICD-10-PCS | Mod: 26,50,, | Performed by: RADIOLOGY

## 2021-12-09 PROCEDURE — 76642 ULTRASOUND BREAST LIMITED: CPT | Mod: 26,50,, | Performed by: RADIOLOGY

## 2022-01-03 ENCOUNTER — LAB VISIT (OUTPATIENT)
Dept: PRIMARY CARE CLINIC | Facility: OTHER | Age: 52
End: 2022-01-03

## 2022-01-03 DIAGNOSIS — R09.81 NASAL CONGESTION: Primary | ICD-10-CM

## 2022-01-03 LAB
CTP QC/QA: YES
SARS-COV-2 AG RESP QL IA.RAPID: POSITIVE

## 2022-01-05 ENCOUNTER — PATIENT MESSAGE (OUTPATIENT)
Dept: INTERNAL MEDICINE | Facility: CLINIC | Age: 52
End: 2022-01-05
Payer: COMMERCIAL

## 2022-01-11 ENCOUNTER — OFFICE VISIT (OUTPATIENT)
Dept: OBSTETRICS AND GYNECOLOGY | Facility: CLINIC | Age: 52
End: 2022-01-11
Payer: COMMERCIAL

## 2022-01-11 VITALS
DIASTOLIC BLOOD PRESSURE: 70 MMHG | WEIGHT: 139.69 LBS | HEIGHT: 64 IN | SYSTOLIC BLOOD PRESSURE: 114 MMHG | BODY MASS INDEX: 23.85 KG/M2

## 2022-01-11 DIAGNOSIS — R23.2 HOT FLASHES: ICD-10-CM

## 2022-01-11 DIAGNOSIS — Z12.4 ENCOUNTER FOR PAPANICOLAOU SMEAR FOR CERVICAL CANCER SCREENING: ICD-10-CM

## 2022-01-11 DIAGNOSIS — Z11.51 ENCOUNTER FOR SCREENING FOR HUMAN PAPILLOMAVIRUS (HPV): Primary | ICD-10-CM

## 2022-01-11 DIAGNOSIS — Z12.11 COLON CANCER SCREENING: ICD-10-CM

## 2022-01-11 PROCEDURE — 3078F DIAST BP <80 MM HG: CPT | Mod: CPTII,S$GLB,, | Performed by: OBSTETRICS & GYNECOLOGY

## 2022-01-11 PROCEDURE — 3074F SYST BP LT 130 MM HG: CPT | Mod: CPTII,S$GLB,, | Performed by: OBSTETRICS & GYNECOLOGY

## 2022-01-11 PROCEDURE — 3008F BODY MASS INDEX DOCD: CPT | Mod: CPTII,S$GLB,, | Performed by: OBSTETRICS & GYNECOLOGY

## 2022-01-11 PROCEDURE — 1160F PR REVIEW ALL MEDS BY PRESCRIBER/CLIN PHARMACIST DOCUMENTED: ICD-10-PCS | Mod: CPTII,S$GLB,, | Performed by: OBSTETRICS & GYNECOLOGY

## 2022-01-11 PROCEDURE — 88175 CYTOPATH C/V AUTO FLUID REDO: CPT | Performed by: OBSTETRICS & GYNECOLOGY

## 2022-01-11 PROCEDURE — 3078F PR MOST RECENT DIASTOLIC BLOOD PRESSURE < 80 MM HG: ICD-10-PCS | Mod: CPTII,S$GLB,, | Performed by: OBSTETRICS & GYNECOLOGY

## 2022-01-11 PROCEDURE — 99396 PREV VISIT EST AGE 40-64: CPT | Mod: S$GLB,,, | Performed by: OBSTETRICS & GYNECOLOGY

## 2022-01-11 PROCEDURE — 99396 PR PREVENTIVE VISIT,EST,40-64: ICD-10-PCS | Mod: S$GLB,,, | Performed by: OBSTETRICS & GYNECOLOGY

## 2022-01-11 PROCEDURE — 3008F PR BODY MASS INDEX (BMI) DOCUMENTED: ICD-10-PCS | Mod: CPTII,S$GLB,, | Performed by: OBSTETRICS & GYNECOLOGY

## 2022-01-11 PROCEDURE — 99999 PR PBB SHADOW E&M-EST. PATIENT-LVL II: ICD-10-PCS | Mod: PBBFAC,,, | Performed by: OBSTETRICS & GYNECOLOGY

## 2022-01-11 PROCEDURE — 1159F MED LIST DOCD IN RCRD: CPT | Mod: CPTII,S$GLB,, | Performed by: OBSTETRICS & GYNECOLOGY

## 2022-01-11 PROCEDURE — 87624 HPV HI-RISK TYP POOLED RSLT: CPT | Performed by: OBSTETRICS & GYNECOLOGY

## 2022-01-11 PROCEDURE — 1160F RVW MEDS BY RX/DR IN RCRD: CPT | Mod: CPTII,S$GLB,, | Performed by: OBSTETRICS & GYNECOLOGY

## 2022-01-11 PROCEDURE — 3074F PR MOST RECENT SYSTOLIC BLOOD PRESSURE < 130 MM HG: ICD-10-PCS | Mod: CPTII,S$GLB,, | Performed by: OBSTETRICS & GYNECOLOGY

## 2022-01-11 PROCEDURE — 1159F PR MEDICATION LIST DOCUMENTED IN MEDICAL RECORD: ICD-10-PCS | Mod: CPTII,S$GLB,, | Performed by: OBSTETRICS & GYNECOLOGY

## 2022-01-11 PROCEDURE — 99999 PR PBB SHADOW E&M-EST. PATIENT-LVL II: CPT | Mod: PBBFAC,,, | Performed by: OBSTETRICS & GYNECOLOGY

## 2022-01-11 RX ORDER — PROGESTERONE 200 MG/1
200 CAPSULE ORAL NIGHTLY
Qty: 30 CAPSULE | Refills: 11 | Status: SHIPPED | OUTPATIENT
Start: 2022-01-11 | End: 2022-01-11

## 2022-01-11 NOTE — PROGRESS NOTES
"Chief Complaint: Well Woman Exam, Menopausal Symptoms     HPI:      Clara Mills is a 51 y.o.  who presents today for well woman exam.  LMP: No LMP recorded. Patient has had an ablation.  Currently c/o going back and forth between hot and cold at night for the past year. Does not break out into a sweat. Feels like this disrupts her sleep only minimally. Specifically, patient denies abnormal vaginal bleeding, discharge, pelvic pain, urinary problems, or changes in appetite. Ms. Mills is currently sexually active with a single male partner.  She declines STD screening today.    Previous Pap: NILM, HPV negative (2016)  Previous Mammogram: BiRads: 2 T-C Score: 7.63% (21)   Most Recent Colonoscopy: Has never had one    OB History        3    Para   3    Term   3            AB        Living   2       SAB        IAB        Ectopic        Multiple        Live Births   2               ROS:     GENERAL: Denies unintentional weight gain or weight loss. Feeling well overall.   SKIN: Denies rash or lesions.   HEENT: Denies headaches, or vision changes.   CARDIOVASCULAR: Denies palpitations or chest pain.   RESPIRATORY: Denies shortness of breath or dyspnea on exertion.  BREASTS: Denies lumps or nipple discharge.   ABDOMEN: Denies constipation, diarrhea, nausea, vomiting, change in appetite.  URINARY: Denies frequency, dysuria.  NEUROLOGIC: Denies syncope or weakness.   PSYCHIATRIC: Denies uncontrolled depression or anxiety.    Physical Exam:      PHYSICAL EXAM:  /70   Ht 5' 4" (1.626 m)   Wt 63.3 kg (139 lb 10.6 oz)   BMI 23.97 kg/m²   Body mass index is 23.97 kg/m².     APPEARANCE: Well nourished, well developed, in no acute distress.  PSYCH: Appropriate mood and affect.  SKIN: No acne or hirsutism  NECK: Neck symmetric without masses  NODES: No inguinal, axillary, or supraclavicular lymph node enlargement  ABDOMEN: Soft.  No tenderness or masses.    CARDIOVASCULAR: No edema of " peripheral extremities  BREASTS: Symmetrical, no visible skin lesions. No palpable masses. No nipple discharge bilaterally.  PELVIC: Normal external genitalia without lesions.  Normal hair distribution.  Adequate perineal body, normal urethral meatus.  Vagina moist and well rugated without lesions or discharge.  Cervix pink, without lesions, discharge or tenderness.  No significant cystocele or rectocele.  Bimanual exam shows uterus to be normal size, regular, mobile and nontender.  Adnexa without masses or tenderness.      Assessment/Plan:     Clara was seen today for annual exam.    Diagnoses and all orders for this visit:    Encounter for screening for human papillomavirus (HPV)  -     HPV High Risk Genotypes, PCR    Encounter for Papanicolaou smear for cervical cancer screening  -     Liquid-Based Pap Smear, Screening    Hot flashes  -     Discussed oral prometrium, but patient does not wish to start meds at this time. Will monitor    Colon cancer screening  -     Case Request Endoscopy: COLONOSCOPY          Follow up in about 1 year (around 1/11/2023) for Annual Exam.    Counseling:     Patient was counseled today on current ASCCP pap guidelines, the recommendation for yearly physical exams, safe driving habits, breast self awareness and annual mammograms. She is to see her PCP for other health maintenance.     Use of the [facility] Patient Portal discussed and encouraged during today's visit.

## 2022-01-18 LAB
FINAL PATHOLOGIC DIAGNOSIS: NORMAL
Lab: NORMAL

## 2022-01-19 ENCOUNTER — PATIENT MESSAGE (OUTPATIENT)
Dept: INTERNAL MEDICINE | Facility: CLINIC | Age: 52
End: 2022-01-19
Payer: COMMERCIAL

## 2022-01-20 LAB
HPV HR 12 DNA SPEC QL NAA+PROBE: NEGATIVE
HPV16 AG SPEC QL: NEGATIVE
HPV18 DNA SPEC QL NAA+PROBE: NEGATIVE

## 2022-02-01 ENCOUNTER — CLINICAL SUPPORT (OUTPATIENT)
Dept: OTHER | Facility: CLINIC | Age: 52
End: 2022-02-01
Payer: COMMERCIAL

## 2022-02-01 DIAGNOSIS — Z00.8 ENCOUNTER FOR OTHER GENERAL EXAMINATION: ICD-10-CM

## 2022-02-02 VITALS — BODY MASS INDEX: 23.6 KG/M2 | HEIGHT: 65 IN

## 2022-02-02 LAB
GLUCOSE SERPL-MCNC: 86 MG/DL (ref 60–140)
HDLC SERPL-MCNC: 71 MG/DL
POC CHOLESTEROL, LDL (DOCK): 97 MG/DL
POC CHOLESTEROL, TOTAL: 180 MG/DL
TRIGL SERPL-MCNC: 55 MG/DL

## 2022-03-07 ENCOUNTER — PATIENT MESSAGE (OUTPATIENT)
Dept: OBSTETRICS AND GYNECOLOGY | Facility: CLINIC | Age: 52
End: 2022-03-07
Payer: COMMERCIAL

## 2022-03-09 ENCOUNTER — OFFICE VISIT (OUTPATIENT)
Dept: OBSTETRICS AND GYNECOLOGY | Facility: CLINIC | Age: 52
End: 2022-03-09
Payer: COMMERCIAL

## 2022-03-09 ENCOUNTER — HOSPITAL ENCOUNTER (OUTPATIENT)
Dept: RADIOLOGY | Facility: HOSPITAL | Age: 52
Discharge: HOME | End: 2022-03-09
Attending: OBSTETRICS & GYNECOLOGY
Payer: COMMERCIAL

## 2022-03-09 ENCOUNTER — PATIENT MESSAGE (OUTPATIENT)
Dept: OBSTETRICS AND GYNECOLOGY | Facility: CLINIC | Age: 52
End: 2022-03-09

## 2022-03-09 VITALS
HEIGHT: 64 IN | DIASTOLIC BLOOD PRESSURE: 64 MMHG | BODY MASS INDEX: 24.28 KG/M2 | SYSTOLIC BLOOD PRESSURE: 110 MMHG | WEIGHT: 142.19 LBS

## 2022-03-09 DIAGNOSIS — N63.10 LUMP OF BREAST, RIGHT: ICD-10-CM

## 2022-03-09 DIAGNOSIS — N63.10 LUMP OF BREAST, RIGHT: Primary | ICD-10-CM

## 2022-03-09 PROCEDURE — 76642 ULTRASOUND BREAST LIMITED: CPT | Mod: 26,RT,, | Performed by: RADIOLOGY

## 2022-03-09 PROCEDURE — 76642 ULTRASOUND BREAST LIMITED: CPT | Mod: TC,RT

## 2022-03-09 PROCEDURE — 3078F DIAST BP <80 MM HG: CPT | Mod: CPTII,S$GLB,, | Performed by: OBSTETRICS & GYNECOLOGY

## 2022-03-09 PROCEDURE — 1159F PR MEDICATION LIST DOCUMENTED IN MEDICAL RECORD: ICD-10-PCS | Mod: CPTII,S$GLB,, | Performed by: OBSTETRICS & GYNECOLOGY

## 2022-03-09 PROCEDURE — 99213 PR OFFICE/OUTPT VISIT, EST, LEVL III, 20-29 MIN: ICD-10-PCS | Mod: S$GLB,,, | Performed by: OBSTETRICS & GYNECOLOGY

## 2022-03-09 PROCEDURE — 3008F BODY MASS INDEX DOCD: CPT | Mod: CPTII,S$GLB,, | Performed by: OBSTETRICS & GYNECOLOGY

## 2022-03-09 PROCEDURE — 3008F PR BODY MASS INDEX (BMI) DOCUMENTED: ICD-10-PCS | Mod: CPTII,S$GLB,, | Performed by: OBSTETRICS & GYNECOLOGY

## 2022-03-09 PROCEDURE — 99999 PR PBB SHADOW E&M-EST. PATIENT-LVL III: CPT | Mod: PBBFAC,,, | Performed by: OBSTETRICS & GYNECOLOGY

## 2022-03-09 PROCEDURE — 3074F PR MOST RECENT SYSTOLIC BLOOD PRESSURE < 130 MM HG: ICD-10-PCS | Mod: CPTII,S$GLB,, | Performed by: OBSTETRICS & GYNECOLOGY

## 2022-03-09 PROCEDURE — 99999 PR PBB SHADOW E&M-EST. PATIENT-LVL III: ICD-10-PCS | Mod: PBBFAC,,, | Performed by: OBSTETRICS & GYNECOLOGY

## 2022-03-09 PROCEDURE — 76642 US BREAST RIGHT LIMITED: ICD-10-PCS | Mod: 26,RT,, | Performed by: RADIOLOGY

## 2022-03-09 PROCEDURE — 1160F PR REVIEW ALL MEDS BY PRESCRIBER/CLIN PHARMACIST DOCUMENTED: ICD-10-PCS | Mod: CPTII,S$GLB,, | Performed by: OBSTETRICS & GYNECOLOGY

## 2022-03-09 PROCEDURE — 99213 OFFICE O/P EST LOW 20 MIN: CPT | Mod: S$GLB,,, | Performed by: OBSTETRICS & GYNECOLOGY

## 2022-03-09 PROCEDURE — 3078F PR MOST RECENT DIASTOLIC BLOOD PRESSURE < 80 MM HG: ICD-10-PCS | Mod: CPTII,S$GLB,, | Performed by: OBSTETRICS & GYNECOLOGY

## 2022-03-09 PROCEDURE — 3074F SYST BP LT 130 MM HG: CPT | Mod: CPTII,S$GLB,, | Performed by: OBSTETRICS & GYNECOLOGY

## 2022-03-09 PROCEDURE — 1160F RVW MEDS BY RX/DR IN RCRD: CPT | Mod: CPTII,S$GLB,, | Performed by: OBSTETRICS & GYNECOLOGY

## 2022-03-09 PROCEDURE — 1159F MED LIST DOCD IN RCRD: CPT | Mod: CPTII,S$GLB,, | Performed by: OBSTETRICS & GYNECOLOGY

## 2022-03-09 NOTE — PROGRESS NOTES
"  Chief Complaint: Breast Lump     HPI:      Clara Mills is a 51 y.o.  who presents complaining of a lump in her right breast. Reports lump is about the size of a golf ball and is tender. She first noticed it two days ago. Denies skin changes to the breast.  Patient does not have regular monthly menses. No LMP recorded (lmp unknown). Patient has had an ablation.     Previous Mammogram: BiRads: 2 T-C Score: 7.64% (21)     Breast U/S (21):   Targeted sonographic interrogation was performed in the area of interest bilaterally. This reveals a 2.9 simple cyst in the 12:00 o'clock axis of the right breast 1 cm from the nipple. There are several simple cyst in the upper outer quadrant of the left breast with the largest measuring 1.7 cm located at 03:00 o'clock axis 4 cm from the nipple. These findings correlate with the masses identified on the recent screening mammogram.     ROS:     GENERAL: Denies unintentional weight loss. Feeling well overall.     Physical Exam:      PHYSICAL EXAM:  /64   Ht 5' 4" (1.626 m)   Wt 64.5 kg (142 lb 3.2 oz)   LMP  (LMP Unknown)   BMI 24.41 kg/m²   Body mass index is 24.41 kg/m².      APPEARANCE: Well nourished, well developed, in no acute distress.  BREASTS: Right: normal in size and symmetry, normal contour with no evidence of flattening or dimpling, skin normal, nipples everted without rashes or discharge, 3 cm mobile, slightly irregular, oval shaped mass in the 11 o'clock position - consistent with previous cyst but larger in size. Left: normal in size and symmetry, normal contour with no evidence of flattening or dimpling, skin normal, nipples everted without rashes or discharge, palpation negative for masses or nodules  ABDOMEN: Soft.  No tenderness or masses.      Assessment/Plan:     Lump of breast, right  -     US Breast Right Complete; Future; Expected date: 2022    Due to pain and increasing cyst size, discussed with patient radiologic " drainage which she would like.

## 2022-04-22 ENCOUNTER — PATIENT MESSAGE (OUTPATIENT)
Dept: ENDOSCOPY | Facility: HOSPITAL | Age: 52
End: 2022-04-22
Payer: COMMERCIAL

## 2022-04-22 DIAGNOSIS — Z12.11 SPECIAL SCREENING FOR MALIGNANT NEOPLASMS, COLON: Primary | ICD-10-CM

## 2022-04-22 RX ORDER — SODIUM, POTASSIUM,MAG SULFATES 17.5-3.13G
1 SOLUTION, RECONSTITUTED, ORAL ORAL DAILY
Qty: 1 KIT | Refills: 0 | Status: SHIPPED | OUTPATIENT
Start: 2022-04-22 | End: 2022-04-24

## 2022-06-13 ENCOUNTER — ANESTHESIA EVENT (OUTPATIENT)
Dept: ENDOSCOPY | Facility: HOSPITAL | Age: 52
End: 2022-06-13
Payer: COMMERCIAL

## 2022-06-14 ENCOUNTER — ANESTHESIA (OUTPATIENT)
Dept: ENDOSCOPY | Facility: HOSPITAL | Age: 52
End: 2022-06-14
Payer: COMMERCIAL

## 2022-06-14 ENCOUNTER — HOSPITAL ENCOUNTER (OUTPATIENT)
Facility: HOSPITAL | Age: 52
Discharge: HOME | End: 2022-06-14
Attending: COLON & RECTAL SURGERY | Admitting: COLON & RECTAL SURGERY
Payer: COMMERCIAL

## 2022-06-14 VITALS
OXYGEN SATURATION: 99 % | HEIGHT: 64 IN | HEART RATE: 66 BPM | TEMPERATURE: 98 F | RESPIRATION RATE: 18 BRPM | WEIGHT: 138 LBS | BODY MASS INDEX: 23.56 KG/M2 | DIASTOLIC BLOOD PRESSURE: 64 MMHG | SYSTOLIC BLOOD PRESSURE: 112 MMHG

## 2022-06-14 DIAGNOSIS — Z12.11 COLON CANCER SCREENING: Primary | ICD-10-CM

## 2022-06-14 LAB
B-HCG UR QL: NEGATIVE
CTP QC/QA: YES

## 2022-06-14 PROCEDURE — 37000009 HC ANESTHESIA EA ADD 15 MINS: Performed by: COLON & RECTAL SURGERY

## 2022-06-14 PROCEDURE — 63600175 PHARM REV CODE 636 W HCPCS: Performed by: NURSE ANESTHETIST, CERTIFIED REGISTERED

## 2022-06-14 PROCEDURE — 45385 COLONOSCOPY W/LESION REMOVAL: CPT | Mod: 33,,, | Performed by: COLON & RECTAL SURGERY

## 2022-06-14 PROCEDURE — 37000008 HC ANESTHESIA 1ST 15 MINUTES: Performed by: COLON & RECTAL SURGERY

## 2022-06-14 PROCEDURE — 25000003 PHARM REV CODE 250: Performed by: OBSTETRICS & GYNECOLOGY

## 2022-06-14 PROCEDURE — 45385 COLONOSCOPY W/LESION REMOVAL: CPT | Mod: PT | Performed by: COLON & RECTAL SURGERY

## 2022-06-14 PROCEDURE — 88305 TISSUE EXAM BY PATHOLOGIST: CPT | Mod: 26,,, | Performed by: PATHOLOGY

## 2022-06-14 PROCEDURE — 88305 TISSUE EXAM BY PATHOLOGIST: ICD-10-PCS | Mod: 26,,, | Performed by: PATHOLOGY

## 2022-06-14 PROCEDURE — 88305 TISSUE EXAM BY PATHOLOGIST: CPT | Performed by: PATHOLOGY

## 2022-06-14 PROCEDURE — 81025 URINE PREGNANCY TEST: CPT | Performed by: COLON & RECTAL SURGERY

## 2022-06-14 PROCEDURE — 27201089 HC SNARE, DISP (ANY): Performed by: COLON & RECTAL SURGERY

## 2022-06-14 PROCEDURE — 45385 PR COLONOSCOPY,REMV LESN,SNARE: ICD-10-PCS | Mod: 33,,, | Performed by: COLON & RECTAL SURGERY

## 2022-06-14 PROCEDURE — 25000003 PHARM REV CODE 250: Performed by: NURSE ANESTHETIST, CERTIFIED REGISTERED

## 2022-06-14 RX ORDER — PROPOFOL 10 MG/ML
VIAL (ML) INTRAVENOUS
Status: DISCONTINUED | OUTPATIENT
Start: 2022-06-14 | End: 2022-06-14

## 2022-06-14 RX ORDER — LIDOCAINE HYDROCHLORIDE 20 MG/ML
INJECTION INTRAVENOUS
Status: DISCONTINUED | OUTPATIENT
Start: 2022-06-14 | End: 2022-06-14

## 2022-06-14 RX ORDER — PROPOFOL 10 MG/ML
VIAL (ML) INTRAVENOUS CONTINUOUS PRN
Status: DISCONTINUED | OUTPATIENT
Start: 2022-06-14 | End: 2022-06-14

## 2022-06-14 RX ORDER — SODIUM CHLORIDE 9 MG/ML
INJECTION, SOLUTION INTRAVENOUS CONTINUOUS
Status: DISCONTINUED | OUTPATIENT
Start: 2022-06-14 | End: 2022-06-14 | Stop reason: HOSPADM

## 2022-06-14 RX ADMIN — Medication 150 MCG/KG/MIN: at 10:06

## 2022-06-14 RX ADMIN — SODIUM CHLORIDE: 0.9 INJECTION, SOLUTION INTRAVENOUS at 10:06

## 2022-06-14 RX ADMIN — PROPOFOL 30 MG: 10 INJECTION, EMULSION INTRAVENOUS at 10:06

## 2022-06-14 RX ADMIN — LIDOCAINE HYDROCHLORIDE 50 MG: 20 INJECTION, SOLUTION INTRAVENOUS at 10:06

## 2022-06-14 RX ADMIN — PROPOFOL 100 MG: 10 INJECTION, EMULSION INTRAVENOUS at 10:06

## 2022-06-14 NOTE — ANESTHESIA PREPROCEDURE EVALUATION
06/14/2022  Clara Mills is a 51 y.o., female.  Past Medical History:   Diagnosis Date    ADD (attention deficit disorder)     HSV infection      Past Surgical History:   Procedure Laterality Date    BUNIONECTOMY Right 09/2016    BUNIONECTOMY Left 12/2016    DIAGNOSTIC LAPAROSCOPY N/A 9/1/2020    Procedure: LAPAROSCOPY, DIAGNOSTIC;  Surgeon: Rebeca Yoon MD;  Location: [facility];  Service: OB/GYN;  Laterality: N/A;    HYSTEROSCOPY N/A 9/1/2020    Procedure: HYSTEROSCOPY;  Surgeon: Rebeca Yoon MD;  Location: [facility];  Service: OB/GYN;  Laterality: N/A;    OTHER SURGICAL HISTORY  1998    uterine suspension    THERMAL ABLATION OF ENDOMETRIUM N/A 9/1/2020    Procedure: ABLATION, ENDOMETRIUM, THERMAL;  Surgeon: Rebeca Yoon MD;  Location: [facility];  Service: OB/GYN;  Laterality: N/A;    TUBAL LIGATION      UTERINE SUSPENSION           Pre-op Assessment    I have reviewed the Patient Summary Reports.     I have reviewed the Nursing Notes. I have reviewed the NPO Status.   I have reviewed the Medications.     Review of Systems  Anesthesia Hx:  No problems with previous Anesthesia    Social:  Social Alcohol Use, Former Smoker    Psych:   Psychiatric History          Physical Exam  General: Well nourished, Cooperative, Alert and Oriented    Airway:  Mallampati: I   Mouth Opening: Normal  TM Distance: Normal  Tongue: Normal  Neck ROM: Normal ROM    Dental:  Intact    Chest/Lungs:  Clear to auscultation, Normal Respiratory Rate    Heart:  Rate: Normal  Rhythm: Regular Rhythm        Anesthesia Plan  Type of Anesthesia, risks & benefits discussed:    Anesthesia Type: Gen Natural Airway  Intra-op Monitoring Plan: Standard ASA Monitors  Post Op Pain Control Plan: multimodal analgesia  Induction:  IV  Informed Consent: Informed consent signed with the Patient and all parties  understand the risks and agree with anesthesia plan.  All questions answered.   ASA Score: 1  Day of Surgery Review of History & Physical: H&P Update referred to the surgeon/provider.    Ready For Surgery From Anesthesia Perspective.     .

## 2022-06-14 NOTE — H&P
Procedure : Colonoscopy    Indication(s):  asymptomatic screening exam    Review of patient's allergies indicates:  No Known Allergies    Past Medical History:   Diagnosis Date    ADD (attention deficit disorder)     HSV infection        Prior to Admission medications    Not on File       Sedation Problems: NO    Family History   Problem Relation Age of Onset    Other Mother         eye problem    Squamous cell carcinoma Father         with radiation    Cancer Father         skin    No Known Problems Brother     No Known Problems Daughter     No Known Problems Son     No Known Problems Brother     No Known Problems Brother     Melanoma Neg Hx        Fam Hx of Sedation Problems: NO    Social History     Socioeconomic History    Marital status:    Occupational History    Occupation:    Tobacco Use    Smoking status: Former Smoker     Years: 5.00    Smokeless tobacco: Never Used   Substance and Sexual Activity    Alcohol use: Yes     Alcohol/week: 5.0 standard drinks     Types: 5 Glasses of wine per week     Comment: occasionally    Drug use: No    Sexual activity: Yes     Partners: Male     Birth control/protection: See Surgical Hx   Social History Narrative    Single ,  2 children, works [employer]       Review of Systems -     Respiratory ROS: no cough, shortness of breath, or wheezing  Cardiovascular ROS: no chest pain or dyspnea on exertion  Gastrointestinal ROS: no abdominal pain, change in bowel habits, or black or bloody stools  Musculoskeletal ROS: negative  Neurological ROS: no TIA or stroke symptoms        Physical Exam:  General: no distress  Head: normocephalic  Airway:  normal oropharynx, airway normal  Neck: supple, symmetrical, trachea midline  Lungs:  normal respiratory effort  Heart: regular rate and rhythm  Abdomen: soft, non-tender non-distented; bowel sounds normal; no masses,  no organomegaly  Extremities: no cyanosis or edema, or clubbing       Deep  Sedation: Mallampati Score per anesthesia     SedationPlan :Moderate     ASA : I    Patient is medically cleared for anesthesia.    Anesthesia/Surgery risks, benefits and alternative options discussed and understood by patient/family.

## 2022-06-14 NOTE — TRANSFER OF CARE
"Anesthesia Transfer of Care Note    Patient: Clara Mills    Procedure(s) Performed: Procedure(s) (LRB):  COLONOSCOPY (N/A)    Patient location: PACU    Anesthesia Type: general    Transport from OR: Transported from OR on room air with adequate spontaneous ventilation    Post pain: adequate analgesia    Post assessment: no apparent anesthetic complications    Post vital signs: stable    Level of consciousness: awake    Nausea/Vomiting: no nausea/vomiting    Complications: none    Transfer of care protocol was followed      Last vitals:   Visit Vitals  /72 (BP Location: Left arm, Patient Position: Lying)   Pulse 87   Temp 36.5 °C (97.7 °F) (Temporal)   Resp 18   Ht 5' 4" (1.626 m)   Wt 62.6 kg (138 lb)   SpO2 99%   Breastfeeding No   BMI 23.69 kg/m²     "

## 2022-06-14 NOTE — PROVATION PATIENT INSTRUCTIONS
Discharge Summary/Instructions after an Endoscopic Procedure  Patient Name: Clara Mills  Patient MRN: [id number]  Patient YOB: 1970 Tuesday, June 14, 2022  Pablo Medrano MD  Dear patient,  As a result of recent federal legislation (The Federal Cures Act), you may   receive lab or pathology results from your procedure in your [facility]   account before your physician is able to contact you. Your physician or   their representative will relay the results to you with their   recommendations at their soonest availability.  Thank you,  RESTRICTIONS:  During your procedure today, you received medications for sedation.  These   medications may affect your judgment, balance and coordination.  Therefore,   for 24 hours, you have the following restrictions:   - DO NOT drive a car, operate machinery, make legal/financial decisions,   sign important papers or drink alcohol.    ACTIVITY:  Today: no heavy lifting, straining or running due to procedural   sedation/anesthesia.  The following day: return to full activity including work.  DIET:  Eat and drink normally unless instructed otherwise.     TREATMENT FOR COMMON SIDE EFFECTS:  - Mild abdominal pain, nausea, belching, bloating or excessive gas:  rest,   eat lightly and use a heating pad.  - Sore Throat: treat with throat lozenges and/or gargle with warm salt   water.  - Because air was used during the procedure, expelling large amounts of air   from your rectum or belching is normal.  - If a bowel prep was taken, you may not have a bowel movement for 1-3 days.    This is normal.  SYMPTOMS TO WATCH FOR AND REPORT TO YOUR PHYSICIAN:  1. Abdominal pain or bloating, other than gas cramps.  2. Chest pain.  3. Back pain.  4. Signs of infection such as: chills or fever occurring within 24 hours   after the procedure.  5. Rectal bleeding, which would show as bright red, maroon, or black stools.   (A tablespoon of blood from the rectum is not serious, especially if    hemorrhoids are present.)  6. Vomiting.  7. Weakness or dizziness.  GO DIRECTLY TO THE NEAREST EMERGENCY ROOM IF YOU HAVE ANY OF THE FOLLOWING:      Difficulty breathing              Chills and/or fever over 101 F   Persistent vomiting and/or vomiting blood   Severe abdominal pain   Severe chest pain   Black, tarry stools   Bleeding- more than one tablespoon   Any other symptom or condition that you feel may need urgent attention  Your doctor recommends these additional instructions:  If any biopsies were taken, your doctors clinic will contact you in 1 to 2   weeks with any results.  - Discharge patient to home.   - Resume previous diet.   - Continue present medications.   - Await pathology results.   - Repeat colonoscopy date to be determined after pending pathology results   are reviewed for surveillance based on pathology results.   - Patient has a contact number available for emergencies.  The signs and   symptoms of potential delayed complications were discussed with the   patient.  Return to normal activities tomorrow.  Written discharge   instructions were provided to the patient.  For questions, problems or results please call your physician - Pablo Medrano MD at Work:  [phone].  [facility], EMERGENCY ROOM PHONE NUMBER: [phone]  IF A COMPLICATION OR EMERGENCY SITUATION ARISES AND YOU ARE UNABLE TO REACH   YOUR PHYSICIAN - GO DIRECTLY TO THE EMERGENCY ROOM.  Pablo Medrano MD  6/14/2022 10:54:44 AM  This report has been verified and signed electronically.  Dear patient,  As a result of recent federal legislation (The Federal Cures Act), you may   receive lab or pathology results from your procedure in your [facility]   account before your physician is able to contact you. Your physician or   their representative will relay the results to you with their   recommendations at their soonest availability.  Thank you,  PROVATION

## 2022-06-14 NOTE — ANESTHESIA POSTPROCEDURE EVALUATION
Anesthesia Post Evaluation    Patient: Clara Mills    Procedure(s) Performed: Procedure(s) (LRB):  COLONOSCOPY (N/A)    Final Anesthesia Type: general      Patient location during evaluation: PACU  Patient participation: Yes- Able to Participate  Level of consciousness: awake and alert  Post-procedure vital signs: reviewed and stable  Pain management: adequate  Airway patency: patent    PONV status at discharge: No PONV  Anesthetic complications: no      Cardiovascular status: hemodynamically stable  Respiratory status: spontaneous ventilation  Follow-up not needed.          Vitals Value Taken Time   /71 06/14/22 1057   Temp 36.5 °C (97.7 °F) 06/14/22 1057   Pulse 87 06/14/22 1057   Resp 18 06/14/22 1057   SpO2 99 % 06/14/22 1057         No case tracking events are documented in the log.      Pain/Joanne Score: Joanne Score: 9 (6/14/2022 10:58 AM)

## 2022-06-20 LAB
FINAL PATHOLOGIC DIAGNOSIS: NORMAL
Lab: NORMAL

## 2022-10-03 ENCOUNTER — PATIENT MESSAGE (OUTPATIENT)
Dept: OBSTETRICS AND GYNECOLOGY | Facility: CLINIC | Age: 52
End: 2022-10-03
Payer: COMMERCIAL

## 2022-11-03 ENCOUNTER — OFFICE VISIT (OUTPATIENT)
Dept: OPTOMETRY | Facility: CLINIC | Age: 52
End: 2022-11-03
Payer: COMMERCIAL

## 2022-11-03 DIAGNOSIS — Z46.0 FITTING AND ADJUSTMENT OF SPECTACLES AND CONTACT LENSES: Primary | ICD-10-CM

## 2022-11-03 DIAGNOSIS — H52.03 HYPEROPIA WITH PRESBYOPIA, BILATERAL: Primary | ICD-10-CM

## 2022-11-03 DIAGNOSIS — Z97.3 WEARS CONTACT LENSES: ICD-10-CM

## 2022-11-03 DIAGNOSIS — H52.4 HYPEROPIA WITH PRESBYOPIA, BILATERAL: Primary | ICD-10-CM

## 2022-11-03 PROCEDURE — 92015 DETERMINE REFRACTIVE STATE: CPT | Mod: S$GLB,,, | Performed by: OPTOMETRIST

## 2022-11-03 PROCEDURE — 92310 CONTACT LENS FITTING OU: CPT | Mod: S$GLB,,, | Performed by: OPTOMETRIST

## 2022-11-03 PROCEDURE — 99999 PR PBB SHADOW E&M-EST. PATIENT-LVL II: ICD-10-PCS | Mod: PBBFAC,,, | Performed by: OPTOMETRIST

## 2022-11-03 PROCEDURE — 92310 PR CONTACT LENS FITTING (NO CHANGE): ICD-10-PCS | Mod: S$GLB,,, | Performed by: OPTOMETRIST

## 2022-11-03 PROCEDURE — 92015 PR REFRACTION: ICD-10-PCS | Mod: S$GLB,,, | Performed by: OPTOMETRIST

## 2022-11-03 PROCEDURE — 99999 PR PBB SHADOW E&M-EST. PATIENT-LVL II: CPT | Mod: PBBFAC,,, | Performed by: OPTOMETRIST

## 2022-11-03 PROCEDURE — 92014 PR EYE EXAM, EST PATIENT,COMPREHESV: ICD-10-PCS | Mod: S$GLB,,, | Performed by: OPTOMETRIST

## 2022-11-03 PROCEDURE — 92014 COMPRE OPH EXAM EST PT 1/>: CPT | Mod: S$GLB,,, | Performed by: OPTOMETRIST

## 2022-11-03 NOTE — PROGRESS NOTES
HPI    JANETTE: 8/17/2021    Presents today for routine/CLFU - OASYS 1 day (monovision OS near)  Pt reports noticeable vision changes.  Pt denies eye pain, floaters and flashes.  Not using any eye meds.  Not using any eye meds.    Last edited by Bhumika Alcocer MA on 11/3/2022  3:28 PM.        ROS    Negative for: Constitutional, Gastrointestinal, Neurological, Skin,   Genitourinary, Musculoskeletal, HENT, Endocrine, Cardiovascular, Eyes,   Respiratory, Psychiatric, Allergic/Imm, Heme/Lymph  Last edited by Pablo Fletcher, JOSE CRUZ on 11/3/2022  3:30 PM.        Assessment /Plan     For exam results, see Encounter Report.    Hyperopia with presbyopia, bilateral    Wears contact lenses      Good fit w OASYS 1 DAY monovision, but needs inc plus OU.  Discussed she may need to get closer to computer    PLAN:    1. DISP 5 pairs trials in peres pt needs  2. Continue Daily Wear schedule.  NO SLEEPING IN CONTACT LENSES. Exchange daily  3. If no problems will call for CLRx, rtc 1 yr

## 2022-11-04 ENCOUNTER — PATIENT MESSAGE (OUTPATIENT)
Dept: OPTOMETRY | Facility: CLINIC | Age: 52
End: 2022-11-04
Payer: COMMERCIAL

## 2022-11-22 ENCOUNTER — PATIENT MESSAGE (OUTPATIENT)
Dept: OBSTETRICS AND GYNECOLOGY | Facility: CLINIC | Age: 52
End: 2022-11-22
Payer: COMMERCIAL

## 2023-02-12 ENCOUNTER — PATIENT MESSAGE (OUTPATIENT)
Dept: OBSTETRICS AND GYNECOLOGY | Facility: CLINIC | Age: 53
End: 2023-02-12
Payer: COMMERCIAL

## 2023-02-13 RX ORDER — ALPRAZOLAM 0.5 MG/1
0.5 TABLET ORAL 2 TIMES DAILY PRN
Qty: 4 TABLET | Refills: 0 | Status: SHIPPED | OUTPATIENT
Start: 2023-02-13 | End: 2023-12-20 | Stop reason: SDUPTHER

## 2023-03-30 ENCOUNTER — CLINICAL SUPPORT (OUTPATIENT)
Dept: OTHER | Facility: CLINIC | Age: 53
End: 2023-03-30

## 2023-03-30 DIAGNOSIS — Z00.8 ENCOUNTER FOR OTHER GENERAL EXAMINATION: ICD-10-CM

## 2023-03-31 VITALS
SYSTOLIC BLOOD PRESSURE: 118 MMHG | DIASTOLIC BLOOD PRESSURE: 70 MMHG | HEIGHT: 64 IN | WEIGHT: 134 LBS | BODY MASS INDEX: 22.88 KG/M2

## 2023-03-31 LAB
HDLC SERPL-MCNC: 74 MG/DL
POC CHOLESTEROL, TOTAL: 201 MG/DL
POC GLUCOSE, FASTING: 90 MG/DL (ref 60–110)
TRIGL SERPL-MCNC: 44 MG/DL

## 2023-08-04 ENCOUNTER — PATIENT MESSAGE (OUTPATIENT)
Dept: OBSTETRICS AND GYNECOLOGY | Facility: CLINIC | Age: 53
End: 2023-08-04
Payer: COMMERCIAL

## 2023-08-04 DIAGNOSIS — Z12.31 BREAST CANCER SCREENING BY MAMMOGRAM: Primary | ICD-10-CM

## 2023-08-14 ENCOUNTER — HOSPITAL ENCOUNTER (OUTPATIENT)
Dept: RADIOLOGY | Facility: OTHER | Age: 53
Discharge: HOME | End: 2023-08-14
Attending: OBSTETRICS & GYNECOLOGY
Payer: COMMERCIAL

## 2023-08-14 ENCOUNTER — PATIENT MESSAGE (OUTPATIENT)
Dept: OBSTETRICS AND GYNECOLOGY | Facility: CLINIC | Age: 53
End: 2023-08-14
Payer: COMMERCIAL

## 2023-08-14 DIAGNOSIS — Z12.31 BREAST CANCER SCREENING BY MAMMOGRAM: ICD-10-CM

## 2023-08-14 PROCEDURE — 77067 SCR MAMMO BI INCL CAD: CPT | Mod: TC

## 2023-08-14 PROCEDURE — 77067 SCR MAMMO BI INCL CAD: CPT | Mod: 26,,, | Performed by: RADIOLOGY

## 2023-08-14 PROCEDURE — 77063 MAMMO DIGITAL SCREENING BILAT WITH TOMO: ICD-10-PCS | Mod: 26,,, | Performed by: RADIOLOGY

## 2023-08-14 PROCEDURE — 77067 MAMMO DIGITAL SCREENING BILAT WITH TOMO: ICD-10-PCS | Mod: 26,,, | Performed by: RADIOLOGY

## 2023-08-14 PROCEDURE — 77063 BREAST TOMOSYNTHESIS BI: CPT | Mod: 26,,, | Performed by: RADIOLOGY

## 2023-08-18 ENCOUNTER — OFFICE VISIT (OUTPATIENT)
Dept: OBSTETRICS AND GYNECOLOGY | Facility: CLINIC | Age: 53
End: 2023-08-18
Payer: COMMERCIAL

## 2023-08-18 VITALS
DIASTOLIC BLOOD PRESSURE: 58 MMHG | BODY MASS INDEX: 22.47 KG/M2 | HEIGHT: 64 IN | SYSTOLIC BLOOD PRESSURE: 110 MMHG | WEIGHT: 131.63 LBS

## 2023-08-18 DIAGNOSIS — Z01.419 ENCOUNTER FOR ANNUAL ROUTINE GYNECOLOGICAL EXAMINATION: Primary | ICD-10-CM

## 2023-08-18 DIAGNOSIS — R39.15 URINARY URGENCY: ICD-10-CM

## 2023-08-18 PROCEDURE — 1159F MED LIST DOCD IN RCRD: CPT | Mod: CPTII,S$GLB,, | Performed by: OBSTETRICS & GYNECOLOGY

## 2023-08-18 PROCEDURE — 3078F PR MOST RECENT DIASTOLIC BLOOD PRESSURE < 80 MM HG: ICD-10-PCS | Mod: CPTII,S$GLB,, | Performed by: OBSTETRICS & GYNECOLOGY

## 2023-08-18 PROCEDURE — 1159F PR MEDICATION LIST DOCUMENTED IN MEDICAL RECORD: ICD-10-PCS | Mod: CPTII,S$GLB,, | Performed by: OBSTETRICS & GYNECOLOGY

## 2023-08-18 PROCEDURE — 99396 PREV VISIT EST AGE 40-64: CPT | Mod: S$GLB,,, | Performed by: OBSTETRICS & GYNECOLOGY

## 2023-08-18 PROCEDURE — 1160F PR REVIEW ALL MEDS BY PRESCRIBER/CLIN PHARMACIST DOCUMENTED: ICD-10-PCS | Mod: CPTII,S$GLB,, | Performed by: OBSTETRICS & GYNECOLOGY

## 2023-08-18 PROCEDURE — 87088 URINE BACTERIA CULTURE: CPT | Performed by: OBSTETRICS & GYNECOLOGY

## 2023-08-18 PROCEDURE — 3074F SYST BP LT 130 MM HG: CPT | Mod: CPTII,S$GLB,, | Performed by: OBSTETRICS & GYNECOLOGY

## 2023-08-18 PROCEDURE — 99396 PR PREVENTIVE VISIT,EST,40-64: ICD-10-PCS | Mod: S$GLB,,, | Performed by: OBSTETRICS & GYNECOLOGY

## 2023-08-18 PROCEDURE — 87086 URINE CULTURE/COLONY COUNT: CPT | Performed by: OBSTETRICS & GYNECOLOGY

## 2023-08-18 PROCEDURE — 3008F BODY MASS INDEX DOCD: CPT | Mod: CPTII,S$GLB,, | Performed by: OBSTETRICS & GYNECOLOGY

## 2023-08-18 PROCEDURE — 3078F DIAST BP <80 MM HG: CPT | Mod: CPTII,S$GLB,, | Performed by: OBSTETRICS & GYNECOLOGY

## 2023-08-18 PROCEDURE — 1160F RVW MEDS BY RX/DR IN RCRD: CPT | Mod: CPTII,S$GLB,, | Performed by: OBSTETRICS & GYNECOLOGY

## 2023-08-18 PROCEDURE — 99999 PR PBB SHADOW E&M-EST. PATIENT-LVL III: CPT | Mod: PBBFAC,,, | Performed by: OBSTETRICS & GYNECOLOGY

## 2023-08-18 PROCEDURE — 99999 PR PBB SHADOW E&M-EST. PATIENT-LVL III: ICD-10-PCS | Mod: PBBFAC,,, | Performed by: OBSTETRICS & GYNECOLOGY

## 2023-08-18 PROCEDURE — 3008F PR BODY MASS INDEX (BMI) DOCUMENTED: ICD-10-PCS | Mod: CPTII,S$GLB,, | Performed by: OBSTETRICS & GYNECOLOGY

## 2023-08-18 PROCEDURE — 3074F PR MOST RECENT SYSTOLIC BLOOD PRESSURE < 130 MM HG: ICD-10-PCS | Mod: CPTII,S$GLB,, | Performed by: OBSTETRICS & GYNECOLOGY

## 2023-08-18 RX ORDER — BIOTIN 10 MG
TABLET ORAL DAILY
COMMUNITY
End: 2024-03-05

## 2023-08-18 NOTE — PROGRESS NOTES
"Chief Complaint: Well Woman Exam, Menopausal Symptoms     HPI:      Clara Mills is a 52 y.o.  who presents today for well woman exam.  LMP: No LMP recorded. Patient has had an ablation.  Hot flashes remain very mild and she has been exercising very regularly. Specifically, patient denies abnormal vaginal bleeding, discharge, pelvic pain, urinary problems, or changes in appetite. Ms. Mills is currently sexually active with a single male partner.  She declines STD screening today.    Previous Pap: NILM, HPV negative (2022)  Previous Mammogram: BiRads: 1 T-C Score: 7.2% (23)   Most Recent Colonoscopy: Benign polyp (2022) - repeat in     OB History          3    Para   3    Term   3            AB        Living   2         SAB        IAB        Ectopic        Multiple        Live Births   2               ROS:     GENERAL: Denies unintentional weight gain or weight loss. Feeling well overall.   SKIN: Denies rash or lesions.   HEENT: Denies headaches, or vision changes.   CARDIOVASCULAR: Denies palpitations or chest pain.   RESPIRATORY: Denies shortness of breath or dyspnea on exertion.  BREASTS: Denies lumps or nipple discharge.   ABDOMEN: Denies constipation, diarrhea, nausea, vomiting, change in appetite.  URINARY: Denies dysuria. +Urinary frequency and urgency. Unsure if it's related to increased water intake.   NEUROLOGIC: Denies syncope or weakness.   PSYCHIATRIC: Denies uncontrolled depression or anxiety.    Physical Exam:      PHYSICAL EXAM:  BP (!) 110/58 (BP Location: Left arm, Patient Position: Sitting, BP Method: Medium (Manual))   Ht 5' 4" (1.626 m)   Wt 59.7 kg (131 lb 9.8 oz)   BMI 22.59 kg/m²   Body mass index is 22.59 kg/m².     APPEARANCE: Well nourished, well developed, in no acute distress.  PSYCH: Appropriate mood and affect.  SKIN: No acne or hirsutism  NECK: Neck symmetric without masses  NODES: No inguinal, axillary, or supraclavicular lymph node " enlargement  ABDOMEN: Soft.  No tenderness or masses.    CARDIOVASCULAR: No edema of peripheral extremities  BREASTS: Symmetrical, no visible skin lesions. No palpable masses. No nipple discharge bilaterally.  PELVIC: Normal external genitalia without lesions.  Normal hair distribution.  Adequate perineal body, normal urethral meatus.  Vagina moist and well rugated without lesions or discharge.  Cervix pink, without lesions, discharge or tenderness.  No significant cystocele or rectocele.  Bimanual exam shows uterus to be normal size, regular, mobile and nontender.  Adnexa without masses or tenderness.      Assessment/Plan:     Encounter for annual routine gynecological examination    Urinary urgency  -     Urine culture  -     Ambulatory referral/consult to Physical/Occupational Therapy; Future      Follow up in about 1 year (around 8/18/2024) for Annual Exam.    Counseling:     Patient was counseled today on current ASCCP pap guidelines, the recommendation for yearly physical exams, safe driving habits, breast self awareness and annual mammograms. She is to see her PCP for other health maintenance.     Use of the [employer] Patient Portal discussed and encouraged during today's visit.

## 2023-08-20 LAB — BACTERIA UR CULT: ABNORMAL

## 2023-08-22 DIAGNOSIS — N95.8 GENITOURINARY SYNDROME OF MENOPAUSE: Primary | ICD-10-CM

## 2023-08-22 RX ORDER — ESTRADIOL 0.1 MG/G
1 CREAM VAGINAL NIGHTLY
Qty: 42.5 G | Refills: 1 | Status: SHIPPED | OUTPATIENT
Start: 2023-08-22 | End: 2024-03-05

## 2023-08-27 ENCOUNTER — OFFICE VISIT (OUTPATIENT)
Dept: URGENT CARE | Facility: CLINIC | Age: 53
End: 2023-08-27
Payer: COMMERCIAL

## 2023-08-27 VITALS
OXYGEN SATURATION: 96 % | HEART RATE: 67 BPM | SYSTOLIC BLOOD PRESSURE: 123 MMHG | BODY MASS INDEX: 22.36 KG/M2 | RESPIRATION RATE: 16 BRPM | TEMPERATURE: 100 F | DIASTOLIC BLOOD PRESSURE: 76 MMHG | WEIGHT: 131 LBS | HEIGHT: 64 IN

## 2023-08-27 DIAGNOSIS — R50.9 LOW GRADE FEVER: ICD-10-CM

## 2023-08-27 DIAGNOSIS — U07.1 COVID: ICD-10-CM

## 2023-08-27 DIAGNOSIS — J06.9 VIRAL URI WITH COUGH: Primary | ICD-10-CM

## 2023-08-27 LAB
CTP QC/QA: YES
SARS-COV-2 AG RESP QL IA.RAPID: POSITIVE

## 2023-08-27 PROCEDURE — 99204 PR OFFICE/OUTPT VISIT, NEW, LEVL IV, 45-59 MIN: ICD-10-PCS | Mod: S$GLB,,, | Performed by: FAMILY MEDICINE

## 2023-08-27 PROCEDURE — 87811 SARS CORONAVIRUS 2 ANTIGEN POCT, MANUAL READ: ICD-10-PCS | Mod: QW,S$GLB,, | Performed by: FAMILY MEDICINE

## 2023-08-27 PROCEDURE — 99204 OFFICE O/P NEW MOD 45 MIN: CPT | Mod: S$GLB,,, | Performed by: FAMILY MEDICINE

## 2023-08-27 PROCEDURE — 87811 SARS-COV-2 COVID19 W/OPTIC: CPT | Mod: QW,S$GLB,, | Performed by: FAMILY MEDICINE

## 2023-08-27 RX ORDER — IBUPROFEN 800 MG/1
800 TABLET ORAL 3 TIMES DAILY PRN
Qty: 21 TABLET | Refills: 0 | Status: SHIPPED | OUTPATIENT
Start: 2023-08-27 | End: 2023-09-03

## 2023-08-27 RX ORDER — PROMETHAZINE HYDROCHLORIDE AND DEXTROMETHORPHAN HYDROBROMIDE 6.25; 15 MG/5ML; MG/5ML
5 SYRUP ORAL EVERY 4 HOURS PRN
Qty: 240 ML | Refills: 0 | Status: SHIPPED | OUTPATIENT
Start: 2023-08-27 | End: 2023-09-06

## 2023-08-27 RX ORDER — LEVOCETIRIZINE DIHYDROCHLORIDE 5 MG/1
5 TABLET, FILM COATED ORAL NIGHTLY
Qty: 14 TABLET | Refills: 0 | Status: SHIPPED | OUTPATIENT
Start: 2023-08-27 | End: 2024-03-05

## 2023-08-27 RX ORDER — FLUTICASONE PROPIONATE 50 MCG
2 SPRAY, SUSPENSION (ML) NASAL 2 TIMES DAILY PRN
Qty: 9.9 ML | Refills: 0 | Status: SHIPPED | OUTPATIENT
Start: 2023-08-27 | End: 2024-03-05

## 2023-08-27 NOTE — PROGRESS NOTES
"Subjective:      Patient ID: Clara Mills is a 52 y.o. female.    Vitals:  height is 5' 4" (1.626 m) and weight is 59.4 kg (131 lb). Her temperature is 100 °F (37.8 °C). Her blood pressure is 123/76 and her pulse is 67. Her respiration is 16 and oxygen saturation is 96%.     Chief Complaint: Fever (Nasal congestion, runny nose, headache, bodyaches,cough. - Entered by patient)    Fever   This is a new problem. The current episode started yesterday. The problem occurs intermittently. The problem has been gradually worsening. The maximum temperature noted was 101 to 101.9 F. The temperature was taken using an oral thermometer. Associated symptoms include congestion, coughing, muscle aches and a sore throat. Pertinent negatives include no abdominal pain, chest pain, nausea or vomiting. She has tried NSAIDs and acetaminophen for the symptoms. The treatment provided moderate relief.       Constitution: Positive for chills, fatigue and fever.   HENT:  Positive for congestion, postnasal drip, sinus pressure and sore throat.    Cardiovascular:  Negative for chest pain.   Respiratory:  Positive for cough. Negative for shortness of breath.    Gastrointestinal:  Negative for abdominal pain, nausea and vomiting.      Objective:     Vitals:    08/27/23 1027   BP: 123/76   BP Location: Right arm   Patient Position: Sitting   BP Method: Medium (Automatic)   Pulse: 67   Resp: 16   Temp: 100 °F (37.8 °C)   SpO2: 96%   Weight: 59.4 kg (131 lb)   Height: 5' 4" (1.626 m)      Physical Exam   Constitutional: She is oriented to person, place, and time.  Non-toxic appearance. She does not appear ill. No distress.   HENT:   Head: Atraumatic.   Eyes: Conjunctivae are normal.   Cardiovascular: Normal rate, regular rhythm, normal heart sounds and normal pulses.   Pulmonary/Chest: Effort normal and breath sounds normal.   Neurological: She is alert and oriented to person, place, and time.   Skin: Skin is not diaphoretic.   Psychiatric: " Judgment and thought content normal.     Results for orders placed or performed in visit on 08/27/23   SARS Coronavirus 2 Antigen, POCT Manual Read   Result Value Ref Range    SARS Coronavirus 2 Antigen Positive (A) Negative     Acceptable Yes       Assessment:     1. Viral URI with cough    2. COVID    3. Low grade fever        Plan:       Viral URI with cough  -     SARS Coronavirus 2 Antigen, POCT Manual Read  -     levocetirizine (XYZAL) 5 MG tablet; Take 1 tablet (5 mg total) by mouth every evening. May substitute for Zyrtec 10 mg daily if Xyzal is not affordable. for 14 days  Dispense: 14 tablet; Refill: 0  -     ibuprofen (ADVIL,MOTRIN) 800 MG tablet; Take 1 tablet (800 mg total) by mouth 3 (three) times daily as needed for Pain. Take with meals  Dispense: 21 tablet; Refill: 0  -     fluticasone propionate (FLONASE) 50 mcg/actuation nasal spray; 2 sprays (100 mcg total) by Each Nostril route 2 (two) times daily as needed for Rhinitis.  Dispense: 9.9 mL; Refill: 0  -     promethazine-dextromethorphan (PROMETHAZINE-DM) 6.25-15 mg/5 mL Syrp; Take 5 mLs by mouth every 4 (four) hours as needed (cough).  Dispense: 240 mL; Refill: 0    2. COVID  Low risk. No antiviral. Patient in agreement.    3. Low grade fever  -     SARS Coronavirus 2 Antigen, POCT Manual Read  -     ibuprofen (ADVIL,MOTRIN) 800 MG tablet; Take 1 tablet (800 mg total) by mouth 3 (three) times daily as needed for Pain. Take with meals  Dispense: 21 tablet; Refill: 0    I have reviewed labs from 2019 onward.  Patient Instructions   Below are suggestions for symptomatic relief of your upper respiratory symptoms:              -Salt water gargles to soothe throat pain.              -Chloroseptic spray and Cepacol lozenges also help to numb throat pain.              -Warm herbal teas with honey/lemon/ginger can help soothe sore throat and hoarseness              -Nasal saline spray reduces inflammation and dryness.              -Warm  face compresses to help with facial sinus pain/pressure.              -Humidifiers and steam can help with nasal dryness and congestion              -Vicks vapor rub at night for chest congestion.              -Flonase OTC or Nasacort OTC for nasal congestion and post-nasal drip. Ok to use twice daily for the first week, then reduce to once daily after symptoms have begun to improve.              -Afrin is a nasal spray that can give immediate relief of nasal congestion but you cannot use this medication for more than 3 days              -Simple foods like chicken noodle soup.              - Mucinex for congestion or Mucinex DM for cough during the day time. Delsym helps with coughing at night. Mucinex-D if you have sinus pressure/sinus pain or chest congestion. (caution if history of high blood pressure or palpitations). You must increase your water intake when using expectorants (Mucinex).             -Zyrtec/Claritin/Allegra/Xyzal should help with allergies.  -If you DO NOT have Hypertension or any history of palpitations, it is ok to take over the counter Sudafed or Mucinex D or Allegra-D or Claritin-D or Zyrtec-D.  -If you do take one of the above, it is ok to combine that with plain over the counter Mucinex or Allegra or Claritin or Zyrtec. If, for example, you are taking Zyrtec -D, you can combine that with Mucinex, but not Mucinex-D.  If you are taking Mucinex-D, you can combine that with plain Allegra or Claritin or Zyrtec.   -If you DO have Hypertension or palpitations, it is safe to take Coricidin HBP for relief of sinus symptoms.     Your test was POSITIVE for COVID-19 (coronavirus).       Please isolate yourself at home.  You may leave home and/or return to work once the following conditions are met:    If you were not hospitalized and are not moderately to severely immunocompromised:   More than 5 days since symptoms first appeared AND  More than 24 hours fever free without medications AND  Symptoms  are improving  Continue to wear a mask around others for 5 additional days.    If you were hospitalized OR are moderately to severely immunocompromised:  More than 20 days since symptoms first appeared  More than 24 hours fever free without medications  Symptoms have improved    If you had no symptoms but tested positive:  More than 5 days since the date of the first positive test (20 days if moderately to severely immunocompromised). If you develop symptoms, then use the guidelines above.  Continue to wear a mask around others for 5 additional days.      Contact Tracing    As one of the next steps, you will receive a call or text from the [place] Department of Health ([facility] COVID-19 Tracing Team. See the contact information below so you know not to ignore the health departments call. It is important that you contact them back immediately so they can help.      Contact Tracer Number:  [phone]  Caller ID for most carriers: [facility]     What is contact tracing?  Contact tracing is a process that helps identify everyone who has been in close contact with an infected person. Contact tracers let those people know they may have been exposed and guide them on next steps. Confidentiality is important for everyone; no one will be told who may have exposed them to the virus.  Your involvement is important. The more we know about where and how this virus is spreading, the better chance we have at stopping it from spreading further.  What does exposure mean?  Exposure means you have been within 6 feet for more than 15 minutes with a person who has or had COVID-19.  What kind of questions do the contact tracers ask?  A contact tracer will confirm your basic contact information including name, address, phone number, and next of kin, as well as asking about any symptoms you may have had. Theyll also ask you how you think you may have gotten sick, such as places where you may have been exposed to the virus, and  people you were with. Those names will never be shared with anyone outside of that call, and will only be used to help trace and stop the spread of the virus.   I have privacy concerns. How will the state use my information?  Your privacy about your health is important. All calls are completed using call centers that use the appropriate health privacy protection measures (HIPAA compliance), meaning that your patient information is safe. No one will ever ask you any questions related to immigration status. Your health comes first.   Do I have to participate?  You do not have to participate, but we strongly encourage you to. Contact tracing can help us catch and control new outbreaks as theyre developing to keep your friends and family safe.   What if I dont hear from anyone?  If you dont receive a call within 24 hours, you can call the number above right away to inquire about your status. That line is open from 8:00 am - 8:00 p.m., 7 days a week.  Contact tracing saves lives! Together, we have the power to beat this virus and keep our loved ones and neighbors safe.    For more information see CDC link below.      https://www.cdc.gov/coronavirus/2019-ncov/hcp/guidance-prevent-spread.html#precautions        Sources:  [facility], [place] [facility]           Seek immediate care in the emergency room in the event of severe abdominal pain, chest pain, respiratory distress, fever unresponsive to antipyretic, dehydration, loss of consciousness, seizure.

## 2023-08-27 NOTE — LETTER
August 27, 2023      Urgent Care [address]  [facility] [address]  Phone: [phone]  Fax: [phone]       Patient: Clara Mills   YOB: 1970  Date of Visit: 08/27/2023    To Whom It May Concern:    Vikash Mills  was at [facility] on 08/27/2023 for COVID symptoms.The patient may discontinue home isolation on 8/31/23 once symptoms improve and she is fever free. No work restrictions upon discontinuing home isolation. If you have any questions or concerns, or if I can be of further assistance, please do not hesitate to contact me.    Sincerely,     Sierra Miles MD

## 2023-08-27 NOTE — PATIENT INSTRUCTIONS
Below are suggestions for symptomatic relief of your upper respiratory symptoms:              -Salt water gargles to soothe throat pain.              -Chloroseptic spray and Cepacol lozenges also help to numb throat pain.              -Warm herbal teas with honey/lemon/irina can help soothe sore throat and hoarseness              -Nasal saline spray reduces inflammation and dryness.              -Warm face compresses to help with facial sinus pain/pressure.              -Humidifiers and steam can help with nasal dryness and congestion              -Vicks vapor rub at night for chest congestion.              -Flonase OTC or Nasacort OTC for nasal congestion and post-nasal drip. Ok to use twice daily for the first week, then reduce to once daily after symptoms have begun to improve.              -Afrin is a nasal spray that can give immediate relief of nasal congestion but you cannot use this medication for more than 3 days              -Simple foods like chicken noodle soup.              - Mucinex for congestion or Mucinex DM for cough during the day time. Delsym helps with coughing at night. Mucinex-D if you have sinus pressure/sinus pain or chest congestion. (caution if history of high blood pressure or palpitations). You must increase your water intake when using expectorants (Mucinex).             -Zyrtec/Claritin/Allegra/Xyzal should help with allergies.  -If you DO NOT have Hypertension or any history of palpitations, it is ok to take over the counter Sudafed or Mucinex D or Allegra-D or Claritin-D or Zyrtec-D.  -If you do take one of the above, it is ok to combine that with plain over the counter Mucinex or Allegra or Claritin or Zyrtec. If, for example, you are taking Zyrtec -D, you can combine that with Mucinex, but not Mucinex-D.  If you are taking Mucinex-D, you can combine that with plain Allegra or Claritin or Zyrtec.   -If you DO have Hypertension or palpitations, it is safe to take Coricidin HBP for  relief of sinus symptoms.     Your test was POSITIVE for COVID-19 (coronavirus).       Please isolate yourself at home.  You may leave home and/or return to work once the following conditions are met:    If you were not hospitalized and are not moderately to severely immunocompromised:   More than 5 days since symptoms first appeared AND  More than 24 hours fever free without medications AND  Symptoms are improving  Continue to wear a mask around others for 5 additional days.    If you were hospitalized OR are moderately to severely immunocompromised:  More than 20 days since symptoms first appeared  More than 24 hours fever free without medications  Symptoms have improved    If you had no symptoms but tested positive:  More than 5 days since the date of the first positive test (20 days if moderately to severely immunocompromised). If you develop symptoms, then use the guidelines above.  Continue to wear a mask around others for 5 additional days.      Contact Tracing    As one of the next steps, you will receive a call or text from the [place] Department of Health ([facility]) COVID-19 Tracing Team. See the contact information below so you know not to ignore the health departments call. It is important that you contact them back immediately so they can help.      Contact Tracer Number:  [phone]  Caller ID for most carriers: [facility]     What is contact tracing?  Contact tracing is a process that helps identify everyone who has been in close contact with an infected person. Contact tracers let those people know they may have been exposed and guide them on next steps. Confidentiality is important for everyone; no one will be told who may have exposed them to the virus.  Your involvement is important. The more we know about where and how this virus is spreading, the better chance we have at stopping it from spreading further.  What does exposure mean?  Exposure means you have been within 6 feet for more than  15 minutes with a person who has or had COVID-19.  What kind of questions do the contact tracers ask?  A contact tracer will confirm your basic contact information including name, address, phone number, and next of kin, as well as asking about any symptoms you may have had. Theyll also ask you how you think you may have gotten sick, such as places where you may have been exposed to the virus, and people you were with. Those names will never be shared with anyone outside of that call, and will only be used to help trace and stop the spread of the virus.   I have privacy concerns. How will the state use my information?  Your privacy about your health is important. All calls are completed using call centers that use the appropriate health privacy protection measures (HIPAA compliance), meaning that your patient information is safe. No one will ever ask you any questions related to immigration status. Your health comes first.   Do I have to participate?  You do not have to participate, but we strongly encourage you to. Contact tracing can help us catch and control new outbreaks as theyre developing to keep your friends and family safe.   What if I dont hear from anyone?  If you dont receive a call within 24 hours, you can call the number above right away to inquire about your status. That line is open from 8:00 am - 8:00 p.m., 7 days a week.  Contact tracing saves lives! Together, we have the power to beat this virus and keep our loved ones and neighbors safe.    For more information see CDC link below.      https://www.cdc.gov/coronavirus/2019-ncov/hcp/guidance-prevent-spread.html#precautions        Sources:  CDC, [place] [facility]           Seek immediate care in the emergency room in the event of severe abdominal pain, chest pain, respiratory distress, fever unresponsive to antipyretic, dehydration, loss of consciousness, seizure.

## 2023-10-23 ENCOUNTER — PATIENT MESSAGE (OUTPATIENT)
Dept: DERMATOLOGY | Facility: CLINIC | Age: 53
End: 2023-10-23
Payer: COMMERCIAL

## 2023-10-26 ENCOUNTER — OFFICE VISIT (OUTPATIENT)
Dept: DERMATOLOGY | Facility: CLINIC | Age: 53
End: 2023-10-26
Payer: COMMERCIAL

## 2023-10-26 DIAGNOSIS — D48.5 NEOPLASM OF UNCERTAIN BEHAVIOR OF SKIN: Primary | ICD-10-CM

## 2023-10-26 PROCEDURE — 99499 NO LOS: ICD-10-PCS | Mod: S$GLB,,, | Performed by: DERMATOLOGY

## 2023-10-26 PROCEDURE — 88342 CHG IMMUNOCYTOCHEMISTRY: ICD-10-PCS | Mod: 26,,, | Performed by: PATHOLOGY

## 2023-10-26 PROCEDURE — 11102 PR TANGENTIAL BIOPSY, SKIN, SINGLE LESION: ICD-10-PCS | Mod: S$GLB,,, | Performed by: DERMATOLOGY

## 2023-10-26 PROCEDURE — 88305 TISSUE EXAM BY PATHOLOGIST: CPT | Performed by: PATHOLOGY

## 2023-10-26 PROCEDURE — 88341 PR IHC OR ICC EACH ADD'L SINGLE ANTIBODY  STAINPR: ICD-10-PCS | Mod: 26,,, | Performed by: PATHOLOGY

## 2023-10-26 PROCEDURE — 99499 UNLISTED E&M SERVICE: CPT | Mod: S$GLB,,, | Performed by: DERMATOLOGY

## 2023-10-26 PROCEDURE — 88305 TISSUE EXAM BY PATHOLOGIST: ICD-10-PCS | Mod: 26,,, | Performed by: PATHOLOGY

## 2023-10-26 PROCEDURE — 88342 IMHCHEM/IMCYTCHM 1ST ANTB: CPT | Mod: 26,,, | Performed by: PATHOLOGY

## 2023-10-26 PROCEDURE — 88305 TISSUE EXAM BY PATHOLOGIST: CPT | Mod: 26,,, | Performed by: PATHOLOGY

## 2023-10-26 PROCEDURE — 88341 IMHCHEM/IMCYTCHM EA ADD ANTB: CPT | Mod: 26,,, | Performed by: PATHOLOGY

## 2023-10-26 PROCEDURE — 11102 TANGNTL BX SKIN SINGLE LES: CPT | Mod: S$GLB,,, | Performed by: DERMATOLOGY

## 2023-10-26 NOTE — PROGRESS NOTES
Patient Information  Name: Clara Mills  : 1970  MRN: [id number]     Referring Physician:  No ref. provider found   Primary Care Physician:  Rebeca Yoon MD   Date of Visit: 10/26/2023      Subjective:     History of Present lllness:    Clara Mills is a 52 y.o. female who presents with a chief complaint of red spot on chest.    Location: midline chest  Duration: months but changed within last week  Signs/Symptoms: inflamed red scaling spot that at times itches  Relieving factors/Prior treatments: none    Patient was last seen: 10/1/2020.  Prior notes by myself reviewed.   Clinical documentation obtained by nursing staff reviewed.    Review of Systems    Objective:   Physical Exam   Constitutional: She appears well-developed and well-nourished. No distress.   Neurological: She is alert and oriented to person, place, and time. She is not disoriented.   Psychiatric: She has a normal mood and affect.   Skin:   Areas Examined (abnormalities noted in diagram):   Chest / Axilla Inspection Performed            Diagram Legend     Erythematous scaling macule/papule c/w actinic keratosis       Vascular papule c/w angioma      Pigmented verrucoid papule/plaque c/w seborrheic keratosis      Yellow umbilicated papule c/w sebaceous hyperplasia      Irregularly shaped tan macule c/w lentigo     1-2 mm smooth white papules consistent with Milia      Movable subcutaneous cyst with punctum c/w epidermal inclusion cyst      Subcutaneous movable cyst c/w pilar cyst      Firm pink to brown papule c/w dermatofibroma      Pedunculated fleshy papule(s) c/w skin tag(s)      Evenly pigmented macule c/w junctional nevus     Mildly variegated pigmented, slightly irregular-bordered macule c/w mildly atypical nevus      Flesh colored to evenly pigmented papule c/w intradermal nevus       Pink pearly papule/plaque c/w basal cell carcinoma      Erythematous hyperkeratotic cursted plaque c/w SCC      Surgical scar with  no sign of skin cancer recurrence      Open and closed comedones      Inflammatory papules and pustules      Verrucoid papule consistent consistent with wart     Erythematous eczematous patches and plaques     Dystrophic onycholytic nail with subungual debris c/w onychomycosis     Umbilicated papule    Erythematous-base heme-crusted tan verrucoid plaque consistent with inflamed seborrheic keratosis     Erythematous Silvery Scaling Plaque c/w Psoriasis     See annotation          [] Data reviewed  [] Prior external notes reviewed  [] Independent review of test  [] Management discussed with another provider  [] Independent historian    Assessment / Plan:      Pathology Orders:       Normal Orders This Visit    Specimen to Pathology, Dermatology     Questions:    Procedure Type: Dermatology and skin neoplasms    Number of Specimens: 1    ------------------------: -------------------------    Spec 1 Procedure: Biopsy    Spec 1 Clinical Impression: r/o BCC    Spec 1 Source: midline chest    Release to patient:           Neoplasm of uncertain behavior of skin  -     Specimen to Pathology, Dermatology    Shave biopsy procedure note:  Risk, benefits, and alternatives of biopsy are discussed with the patient, including risk of infection, scar, recurrence, and need for additional treatment of site. The patient agrees to the procedure by verbal consent. The area is marked and prepped with alcohol.  Approximately 1 mL of lidocaine 1% with epinephrine is used for local anesthesia. A sharp blade is used to remove a portion of the lesion. The specimen is sent for pathology. Hemostasis is obtained with aluminum chloride and/or monopolar hyfrecation if needed. The area is then dressed and bandaged. The patient tolerated the procedure well without adverse event. Written instructions on wound care were given and were reviewed with the patient, who is to call for any signs of bleeding or infection. The patient will be notified of the  pathology results.         Follow up dependent on pathology results.      Lakshmi Calderon MD, FAAD  [facility]

## 2023-10-26 NOTE — PATIENT INSTRUCTIONS
Biopsy Wound Care Instructions    Leave the bandage on for 24 hours without getting it wet.   Clean the area once a day with a gentle soap and water, then pat dry and apply Vaseline and a bandaid.  The site should be kept moist with Vaseline at all times to improve healing. Reapply a thick coating as needed. Do not let the site air out or form a scab, as this will delay healing and worsen scarring.  If any bleeding or oozing occurs once you return home, apply firm pressure to the area for 30 minutes straight without peeking. If bleeding continues, call the office immediately.  Please message us via [facility], call us at [phone], or return to the office at any sign of increasing redness, swelling, tenderness, pain, heat, yellow drainage/discharge, or continued bleeding.      Receiving Your Pathology Results    Your pathology results will be released to you on [facility] at the same time that Dr. Calderon receives them.   Dr. Calderon will then message you with her interpretation of the results and/or with the plan going forward.  If you do not use [facility] or if your pathology results require more of an explanation, you will receive your results via a phone call.  If 2 weeks go by and you have not received your results, please message us via [facility] or call us at [phone] to inform us.

## 2023-11-03 LAB
FINAL PATHOLOGIC DIAGNOSIS: NORMAL
Lab: NORMAL

## 2023-11-07 NOTE — PROGRESS NOTES
Spoke with patient regarding pathology results.  Discussed risks, benefits, and alternatives of excision vs ED&C vs topical chemo. Pt chooses excision.   Please call pt to schedule excision and review pre-/post-op instructions.  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

## 2023-11-08 ENCOUNTER — PATIENT MESSAGE (OUTPATIENT)
Dept: DERMATOLOGY | Facility: CLINIC | Age: 53
End: 2023-11-08
Payer: COMMERCIAL

## 2023-11-08 ENCOUNTER — TELEPHONE (OUTPATIENT)
Dept: DERMATOLOGY | Facility: CLINIC | Age: 53
End: 2023-11-08
Payer: COMMERCIAL

## 2023-11-08 NOTE — TELEPHONE ENCOUNTER
Discussed biopsy results and scheduled procedure ----- Message from Lakshmi Calderon MD sent at 11/7/2023  4:54 PM CST -----  Spoke with patient regarding pathology results.  Discussed risks, benefits, and alternatives of excision vs ED&C vs topical chemo. Pt chooses excision.   Please call pt to schedule excision and review pre-/post-op instructions.  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

## 2023-11-30 ENCOUNTER — PATIENT MESSAGE (OUTPATIENT)
Dept: DERMATOLOGY | Facility: CLINIC | Age: 53
End: 2023-11-30

## 2023-11-30 ENCOUNTER — PROCEDURE VISIT (OUTPATIENT)
Dept: DERMATOLOGY | Facility: CLINIC | Age: 53
End: 2023-11-30
Payer: COMMERCIAL

## 2023-11-30 DIAGNOSIS — D04.5 SQUAMOUS CELL CARCINOMA IN SITU OF SKIN OF CHEST: Primary | ICD-10-CM

## 2023-11-30 PROCEDURE — 99499 NO LOS: ICD-10-PCS | Mod: S$GLB,,, | Performed by: DERMATOLOGY

## 2023-11-30 PROCEDURE — 11602 EXC TR-EXT MAL+MARG 1.1-2 CM: CPT | Mod: S$GLB,,, | Performed by: DERMATOLOGY

## 2023-11-30 PROCEDURE — 13101 CMPLX RPR TRUNK 2.6-7.5 CM: CPT | Mod: 51,S$GLB,, | Performed by: DERMATOLOGY

## 2023-11-30 PROCEDURE — 13101 PR RECMPL WND TRUNK 2.6-7.5 CM: ICD-10-PCS | Mod: 51,S$GLB,, | Performed by: DERMATOLOGY

## 2023-11-30 PROCEDURE — 88305 TISSUE EXAM BY PATHOLOGIST: CPT | Performed by: PATHOLOGY

## 2023-11-30 PROCEDURE — 88305 TISSUE EXAM BY PATHOLOGIST: ICD-10-PCS | Mod: 26,,, | Performed by: PATHOLOGY

## 2023-11-30 PROCEDURE — 11602 PR EXC SKIN MALIG 1.1-2 CM TRUNK,ARM,LEG: ICD-10-PCS | Mod: S$GLB,,, | Performed by: DERMATOLOGY

## 2023-11-30 PROCEDURE — 99499 UNLISTED E&M SERVICE: CPT | Mod: S$GLB,,, | Performed by: DERMATOLOGY

## 2023-11-30 PROCEDURE — 88305 TISSUE EXAM BY PATHOLOGIST: CPT | Mod: 26,,, | Performed by: PATHOLOGY

## 2023-11-30 NOTE — PROGRESS NOTES
PROCEDURE NOTE: EXCISION WITH COMPLEX REPAIR    DIAGNOSIS: Squamous cell carcinoma in situ of skin     LOCATION: midline chest     ASSISTANT: Joycelyn Arora LPN    ANESTHESIA:  1% Lidocaine with Epinephrine and Sodium bicarbonate, 9 mL    PREPARATION: The diagnosis, procedure, alternatives, benefits and risks, including but not limited to: infection, bleeding/bruising, scar or cosmetic defect, local sensation disturbances, wound dehiscence (separation of wound edges after sutures removed) and/or recurrence of present condition were explained to the patient. The patient elected to proceed with the procedure by signing an informed consent.  Patient's identity was verified, and the site was verified.    PROCEDURE: Lesional tissue was carefully marked with at least 4 mm margins of clinically normal skin in all directions. The area to be anesthetized was cleaned with Hibiclens solution, injected with local anesthesia, and again prepped with Hibiclens and draped in sterile fashion to produce a suitable field for surgery. A fusiform excision was performed with a #15 blade carried down completely through the dermis into the subcutaneous tissue, and dissection was carried out in that plane. The specimen of tissue was removed to be sent for histologic confirmation. Bleeding points were stopped with monopolar hyfrecation or tied with absorbable suture as needed throughout the procedure to maintain hemostasis.     CLOSURE: The wound was extensively undermined to a distance greater than the maximum width of the defect (>2 cm) along at least one edge of the defect to mobilize tissue and to reduce tension on the sutured wound edges.  3-0 PDS II absorbable suture was used in a layered fashion closure with fascial plication to reduce tension on the wound, as well as with 4-0 PDS II buried vertical mattress sutures through dermis and subcutaneous tissue to close the wound and to stanley the cutaneous wound edge. This allows for  preservation of structure and function of surrounding anatomy. The epidermis and dermis were sutured with 5-0 Prolene in an interrupted fashion.     SIZE OF DEFECT: 1.4 x 1.2 cm    LENGTH OF REPAIR: 3.5 cm     The area was then cleaned, and a sterile pressure dressing applied. The procedure was tolerated well without adverse event and negligible blood loss. The patient was discharged in stable condition.     Post op instructions: the patient was verbally (and in writing) educated on care of the wound and told to call or return for bleeding, tenderness, redness, etc. Patient should use acetaminophen 1000 mg every 8 hours for pain relief if needed.     Suture removal in 10 day(s).

## 2023-12-06 LAB
FINAL PATHOLOGIC DIAGNOSIS: NORMAL
GROSS: NORMAL
Lab: NORMAL
MICROSCOPIC EXAM: NORMAL

## 2023-12-06 NOTE — PROGRESS NOTES
Final Pathologic Diagnosis 1. Skin, midline chest, excision:  - SMALL FOCUS OF RESIDUAL SQUAMOUS CELL CARCINOMA IN SITU.  - MARGINS ARE NEGATIVE.  - DERMAL SCAR.

## 2023-12-12 ENCOUNTER — CLINICAL SUPPORT (OUTPATIENT)
Dept: DERMATOLOGY | Facility: CLINIC | Age: 53
End: 2023-12-12
Payer: COMMERCIAL

## 2023-12-12 DIAGNOSIS — Z48.02 VISIT FOR SUTURE REMOVAL: Primary | ICD-10-CM

## 2023-12-12 PROCEDURE — 99024 PR POST-OP FOLLOW-UP VISIT: ICD-10-PCS | Mod: S$GLB,,, | Performed by: DERMATOLOGY

## 2023-12-12 PROCEDURE — 99024 POSTOP FOLLOW-UP VISIT: CPT | Mod: S$GLB,,, | Performed by: DERMATOLOGY

## 2023-12-12 NOTE — PROGRESS NOTES
Suture Removal note:  CC: 52 y.o. female patient is here for suture removal.     HPI: Patient is s/p excision of SCCis from the midline chest midline chest on 11/30/23.  Patient reports no problems.    WOUND PE:  Sutures intact.  Wound healing well.  Good approximation of skin edges.  No signs or symptoms of infection.    IMPRESSION:    - SMALL FOCUS OF RESIDUAL SQUAMOUS CELL CARCINOMA IN SITU.  - MARGINS ARE NEGATIVE.  - DERMAL SCAR.    PLAN:  Sutures removed today.  Continue wound care.

## 2023-12-21 RX ORDER — ALPRAZOLAM 0.5 MG/1
0.5 TABLET ORAL 2 TIMES DAILY PRN
Qty: 4 TABLET | Refills: 0 | Status: SHIPPED | OUTPATIENT
Start: 2023-12-21 | End: 2024-03-05

## 2024-02-06 ENCOUNTER — CLINICAL SUPPORT (OUTPATIENT)
Dept: OTHER | Facility: CLINIC | Age: 54
End: 2024-02-06

## 2024-02-06 DIAGNOSIS — Z00.8 ENCOUNTER FOR OTHER GENERAL EXAMINATION: ICD-10-CM

## 2024-02-07 VITALS
WEIGHT: 130 LBS | SYSTOLIC BLOOD PRESSURE: 97 MMHG | BODY MASS INDEX: 22.2 KG/M2 | HEIGHT: 64 IN | DIASTOLIC BLOOD PRESSURE: 53 MMHG

## 2024-02-07 LAB
GLUCOSE SERPL-MCNC: 93 MG/DL (ref 60–140)
HDLC SERPL-MCNC: 69 MG/DL
POC CHOLESTEROL, LDL (DOCK): 114 MG/DL
POC CHOLESTEROL, TOTAL: 198 MG/DL
TRIGL SERPL-MCNC: 86 MG/DL

## 2024-03-05 ENCOUNTER — OFFICE VISIT (OUTPATIENT)
Dept: DERMATOLOGY | Facility: CLINIC | Age: 54
End: 2024-03-05
Payer: COMMERCIAL

## 2024-03-05 DIAGNOSIS — Z12.83 SCREENING EXAM FOR SKIN CANCER: ICD-10-CM

## 2024-03-05 DIAGNOSIS — L57.0 ACTINIC KERATOSIS: Primary | ICD-10-CM

## 2024-03-05 DIAGNOSIS — L82.1 SEBORRHEIC KERATOSIS: ICD-10-CM

## 2024-03-05 DIAGNOSIS — D22.9 MULTIPLE BENIGN MELANOCYTIC NEVI: ICD-10-CM

## 2024-03-05 DIAGNOSIS — Z85.828 HISTORY OF NONMELANOMA SKIN CANCER: ICD-10-CM

## 2024-03-05 DIAGNOSIS — L81.4 LENTIGINES: ICD-10-CM

## 2024-03-05 PROCEDURE — 17000 DESTRUCT PREMALG LESION: CPT | Mod: S$GLB,,, | Performed by: DERMATOLOGY

## 2024-03-05 PROCEDURE — 1159F MED LIST DOCD IN RCRD: CPT | Mod: CPTII,S$GLB,, | Performed by: DERMATOLOGY

## 2024-03-05 PROCEDURE — 17003 DESTRUCT PREMALG LES 2-14: CPT | Mod: S$GLB,,, | Performed by: DERMATOLOGY

## 2024-03-05 PROCEDURE — 1160F RVW MEDS BY RX/DR IN RCRD: CPT | Mod: CPTII,S$GLB,, | Performed by: DERMATOLOGY

## 2024-03-05 PROCEDURE — 99213 OFFICE O/P EST LOW 20 MIN: CPT | Mod: 25,S$GLB,, | Performed by: DERMATOLOGY

## 2024-03-05 NOTE — PROGRESS NOTES
"  Patient Information  Name: Clara Mills  : 1970  MRN: [id number]     Referring Physician:  No ref. provider found   Primary Care Physician:  Rebeca Yoon MD   Date of Visit: 2024      Subjective:     History of Present lllness:    Clara Mills is a 53 y.o. female who presents with a chief complaint of moles, spot, and lesion.  This is a high risk patient with a personal history of nonmelanoma skin cancer who is here today to check for the development of new lesions.  Patient is here today for a "mole" check.     Today, patient complains of:    Spot  Location: left tip of nose  Duration: months  Symptoms: dry patch  Relieving factors/Previous treatments: none    Lesion  Location: right forearm  Duration: months  Signs/Symptoms: looks different from other spots  Exacerbating factors: none  Relieving factors/Prior treatments: none    Patient was last seen: 2023.  Prior notes by myself reviewed.   Clinical documentation obtained by nursing staff reviewed.    Review of Systems    Objective:   Physical Exam   Constitutional: She appears well-developed and well-nourished. No distress.   HENT:   Mouth/Throat: Lips normal.    Eyes: Lids are normal.  No conjunctival no injection.   Neurological: She is alert and oriented to person, place, and time. She is not disoriented.   Psychiatric: She has a normal mood and affect.   Skin:   Areas Examined (abnormalities noted in diagram):   Scalp / Hair Palpated and Inspected  Head / Face Inspection Performed  Neck Inspection Performed  Chest / Axilla Inspection Performed  Abdomen Inspection Performed  Genitals / Buttocks / Groin Inspection Performed  Back Inspection Performed  RUE Inspected  LUE Inspection Performed  RLE Inspected  LLE Inspection Performed  Nails and Digits Inspection Performed                 Diagram Legend     Erythematous scaling macule/papule c/w actinic keratosis       Vascular papule c/w angioma      Pigmented verrucoid " papule/plaque c/w seborrheic keratosis      Yellow umbilicated papule c/w sebaceous hyperplasia      Irregularly shaped tan macule c/w lentigo     1-2 mm smooth white papules consistent with Milia      Movable subcutaneous cyst with punctum c/w epidermal inclusion cyst      Subcutaneous movable cyst c/w pilar cyst      Firm pink to brown papule c/w dermatofibroma      Pedunculated fleshy papule(s) c/w skin tag(s)      Evenly pigmented macule c/w junctional nevus     Mildly variegated pigmented, slightly irregular-bordered macule c/w mildly atypical nevus      Flesh colored to evenly pigmented papule c/w intradermal nevus       Pink pearly papule/plaque c/w basal cell carcinoma      Erythematous hyperkeratotic cursted plaque c/w SCC      Surgical scar with no sign of skin cancer recurrence      Open and closed comedones      Inflammatory papules and pustules      Verrucoid papule consistent consistent with wart     Erythematous eczematous patches and plaques     Dystrophic onycholytic nail with subungual debris c/w onychomycosis     Umbilicated papule    Erythematous-base heme-crusted tan verrucoid plaque consistent with inflamed seborrheic keratosis     Erythematous Silvery Scaling Plaque c/w Psoriasis     See annotation    No images are attached to the encounter or orders placed in the encounter.      [] Data reviewed  [] Prior external notes reviewed  [] Independent review of test  [] Management discussed with another provider  [] Independent historian    Assessment / Plan:        Actinic keratosis  Cryosurgery procedure note:  Risk, benefits, and alternatives of cryosurgery are discussed with the patient, including but not limited to the risks of hypopigmentation, hyperpigmentation, scar, infection, recurrence of lesion(s), development of new lesion(s), and need for additional treatment of the lesion(s). Verbal consent obtained from patient. Liquid nitrogen cryosurgery applied to 3 lesion(s) to produce a freeze  injury. Counseled patient that blisters may form, and instructed patient on wound care with gentle cleansing and use of Vaseline ointment to keep moist until healed. Handout was provided, and patient was instructed to return to clinic in 1-2 months if lesions do not completely resolve.    Multiple benign melanocytic nevi  Multiple benign-appearing nevi present on exam today. Reassurance provided. Counseled patient to periodically examine moles and return to clinic if any changes in size, shape, or color are noted or if it becomes symptomatic (bleeding, itching, pain, etc).  Recommend using a broad-spectrum, water-resistant sunscreen with SPF of 30 or higher--reapply every 2 hours. Seek shade, wear sun-protective clothing, and perform regular skin self-exams.    Lentigines  These are benign sun spots which should be monitored for changes. Daily sun protection will reduce the number of new lesions.   Recommend using a broad-spectrum, water-resistant sunscreen with SPF of 30 or higher--reapply every 2 hours. Seek shade, wear sun-protective clothing, and perform regular skin self-exams.    Seborrheic keratosis  These are benign, inherited growths without a malignant potential. Reassurance given to patient. No treatment is necessary.    History of nonmelanoma skin cancer, SCC   - stable and chronic  Area(s) of previous nonmelanoma skin cancer evaluated with no evidence of recurrence. Reassurance provided.  Recommend using a broad-spectrum, water-resistant sunscreen with SPF of 30 or higher--reapply every 2 hours. Seek shade, wear sun-protective clothing, and perform regular skin self-exams.    Screening exam for skin cancer  Total body skin examination performed today as noted in physical exam. No lesions suspicious for malignancy were seen.  Recommend using a broad-spectrum, water-resistant sunscreen with SPF of 30 or higher--reapply every 2 hours. Seek shade, wear sun-protective clothing, and perform regular skin  self-exams.      Follow up in about 1 year (around 3/5/2025) for TBSE, or sooner if any new problems or changing lesions.      Lakshmi Calderon MD, FAAD  [facility]

## 2024-03-07 ENCOUNTER — OFFICE VISIT (OUTPATIENT)
Dept: OPTOMETRY | Facility: CLINIC | Age: 54
End: 2024-03-07
Payer: COMMERCIAL

## 2024-03-07 DIAGNOSIS — Z46.0 FITTING AND ADJUSTMENT OF SPECTACLES AND CONTACT LENSES: Primary | ICD-10-CM

## 2024-03-07 DIAGNOSIS — Z97.3 WEARS CONTACT LENSES: ICD-10-CM

## 2024-03-07 DIAGNOSIS — H52.4 HYPEROPIA WITH PRESBYOPIA, BILATERAL: Primary | ICD-10-CM

## 2024-03-07 DIAGNOSIS — H52.03 HYPEROPIA WITH PRESBYOPIA, BILATERAL: Primary | ICD-10-CM

## 2024-03-07 PROCEDURE — 92014 COMPRE OPH EXAM EST PT 1/>: CPT | Mod: S$GLB,,, | Performed by: OPTOMETRIST

## 2024-03-07 PROCEDURE — 92015 DETERMINE REFRACTIVE STATE: CPT | Mod: S$GLB,,, | Performed by: OPTOMETRIST

## 2024-03-07 PROCEDURE — 99999 PR PBB SHADOW E&M-EST. PATIENT-LVL II: CPT | Mod: PBBFAC,,, | Performed by: OPTOMETRIST

## 2024-03-07 PROCEDURE — 92310 CONTACT LENS FITTING OU: CPT | Mod: S$GLB,,, | Performed by: OPTOMETRIST

## 2024-03-07 NOTE — PROGRESS NOTES
HPI    52 Y/o female is here for routine eye exam with C/o pt states she is   noticing some vision change pt states it has just been a little harder to   see both near and distance   Pt denies pain and discomfort   No f/f    Eye med: no gtt   Last edited by Neema Sullivan MA on 3/7/2024  7:44 AM.            Assessment /Plan     For exam results, see Encounter Report.    Hyperopia with presbyopia, bilateral    Wears contact lenses          Good fit w OASYS 1 DAY monovision--pt happy    PLAN:    1. Wrote CLRx  2. Continue Daily Wear schedule.  NO SLEEPING IN CONTACT LENSES. Exchange daily  3.  rtc 1 yr

## 2024-03-14 ENCOUNTER — PATIENT MESSAGE (OUTPATIENT)
Dept: OPTOMETRY | Facility: CLINIC | Age: 54
End: 2024-03-14
Payer: COMMERCIAL

## 2024-06-28 NOTE — TELEPHONE ENCOUNTER
Refill Routing Note   Medication(s) are not appropriate for processing by [facility] for the following reason(s):        Outside of protocol    [facility] action(s):  Route               Appointments  past 12m or future 3m with PCP    Date Provider   Last Visit   Visit date not found Argentina Bhakta MD   Next Visit   Visit date not found Argentina Bhakta MD   ED visits in past 90 days: 0        Note composed:2:15 PM 06/28/2024

## 2024-07-01 RX ORDER — ALPRAZOLAM 0.5 MG/1
0.5 TABLET ORAL 2 TIMES DAILY PRN
Qty: 4 TABLET | Refills: 0 | Status: SHIPPED | OUTPATIENT
Start: 2024-07-01 | End: 2024-07-03

## 2024-12-04 ENCOUNTER — OFFICE VISIT (OUTPATIENT)
Dept: URGENT CARE | Facility: CLINIC | Age: 54
End: 2024-12-04
Payer: COMMERCIAL

## 2024-12-04 VITALS
OXYGEN SATURATION: 98 % | SYSTOLIC BLOOD PRESSURE: 93 MMHG | TEMPERATURE: 99 F | HEIGHT: 64 IN | DIASTOLIC BLOOD PRESSURE: 54 MMHG | HEART RATE: 65 BPM | WEIGHT: 130 LBS | RESPIRATION RATE: 18 BRPM | BODY MASS INDEX: 22.2 KG/M2

## 2024-12-04 DIAGNOSIS — J06.9 VIRAL URI WITH COUGH: Primary | ICD-10-CM

## 2024-12-04 LAB
CTP QC/QA: YES
CTP QC/QA: YES
MOLECULAR STREP A: NEGATIVE
SARS-COV-2 RDRP RESP QL NAA+PROBE: NEGATIVE

## 2024-12-04 RX ORDER — FLUTICASONE PROPIONATE 50 MCG
1 SPRAY, SUSPENSION (ML) NASAL DAILY PRN
Qty: 16 G | Refills: 0 | Status: SHIPPED | OUTPATIENT
Start: 2024-12-04

## 2024-12-04 RX ORDER — MUPIROCIN 20 MG/G
OINTMENT TOPICAL 2 TIMES DAILY PRN
Qty: 22 G | Refills: 0 | Status: SHIPPED | OUTPATIENT
Start: 2024-12-04

## 2024-12-04 RX ORDER — IBUPROFEN 600 MG/1
600 TABLET ORAL EVERY 8 HOURS PRN
Qty: 30 TABLET | Refills: 0 | Status: SHIPPED | OUTPATIENT
Start: 2024-12-04

## 2024-12-04 NOTE — PROGRESS NOTES
"Subjective:      Patient ID: Clara Mills is a 53 y.o. female.    Vitals:  height is 5' 4" (1.626 m) and weight is 59 kg (130 lb). Her oral temperature is 98.5 °F (36.9 °C). Her blood pressure is 93/54 (abnormal) and her pulse is 65. Her respiration is 18 and oxygen saturation is 98%.     Chief Complaint: Sore Throat (Cough, sore throat, congestion, runny nose. - Entered by patient)    This is a 53 y.o. female who presents today with a chief complaint of sore throat. Pt states symptoms started on Saturday. Pt also c/o congestion, cough, headaches, runny nose, and trouble swallowing.    Reports reoccurring sore/blister to left nostril when symptomatic with cold symptoms.    Sore Throat   This is a new problem. The current episode started in the past 7 days. The problem has been unchanged. There has been no fever. The patient is experiencing no pain. Associated symptoms include congestion, coughing, headaches and trouble swallowing. Pertinent negatives include no diarrhea, shortness of breath or vomiting. She has tried nothing for the symptoms. The treatment provided no relief.       Constitution: Negative for fever.   HENT:  Positive for congestion, postnasal drip, sore throat and trouble swallowing.    Respiratory:  Positive for cough. Negative for shortness of breath and wheezing.    Gastrointestinal:  Negative for vomiting and diarrhea.   Neurological:  Positive for headaches.      Results for orders placed or performed in visit on 12/04/24   POCT COVID-19 Rapid Screening    Collection Time: 12/04/24 11:27 AM   Result Value Ref Range    POC Rapid COVID Negative Negative     Acceptable Yes    POCT Strep A, Molecular    Collection Time: 12/04/24 11:53 AM   Result Value Ref Range    Molecular Strep A, POC Negative Negative     Acceptable Yes       Objective:     Physical Exam   Constitutional: She is oriented to person, place, and time. She appears well-developed. She is " cooperative.  Non-toxic appearance. She does not appear ill. No distress.   HENT:   Head: Normocephalic.   Ears:   Right Ear: Hearing, tympanic membrane, external ear and ear canal normal.   Left Ear: Hearing, tympanic membrane, external ear and ear canal normal.   Nose: Congestion present. No mucosal edema, rhinorrhea or nasal deformity. No epistaxis. Right sinus exhibits no maxillary sinus tenderness and no frontal sinus tenderness. Left sinus exhibits no maxillary sinus tenderness and no frontal sinus tenderness.      Comments: Left nasal irritaiton  Mouth/Throat: Uvula is midline and mucous membranes are normal. Mucous membranes are moist. No trismus in the jaw. Normal dentition. No uvula swelling. Posterior oropharyngeal erythema present. No oropharyngeal exudate or posterior oropharyngeal edema. Oropharynx is clear.   Eyes: Conjunctivae and lids are normal. No scleral icterus.   Neck: Trachea normal and phonation normal. Neck supple. No edema present. No erythema present. No neck rigidity present.   Cardiovascular: Normal rate and regular rhythm.   Pulmonary/Chest: Effort normal and breath sounds normal. No respiratory distress. She has no decreased breath sounds. She has no wheezes. She has no rhonchi.   Abdominal: Normal appearance.   Musculoskeletal: Normal range of motion.         General: No deformity. Normal range of motion.   Neurological: She is alert and oriented to person, place, and time. She exhibits normal muscle tone. Coordination normal.   Skin: Skin is warm, dry, intact, not diaphoretic and not pale.   Psychiatric: Her speech is normal and behavior is normal. Judgment and thought content normal.   Nursing note and vitals reviewed.      Assessment:     1. Viral URI with cough        Plan:       Viral URI with cough  -     POCT COVID-19 Rapid Screening  -     POCT Strep A, Molecular  -     fluticasone propionate (FLONASE) 50 mcg/actuation nasal spray; 1 spray (50 mcg total) by Each Nostril route  daily as needed (nasal congestion).  Dispense: 16 g; Refill: 0  -     ibuprofen (ADVIL,MOTRIN) 600 MG tablet; Take 1 tablet (600 mg total) by mouth every 8 (eight) hours as needed (pain or fever).  Dispense: 30 tablet; Refill: 0  -     mupirocin (BACTROBAN) 2 % ointment; Apply topically 2 (two) times daily as needed (nasal irritation).  Dispense: 22 g; Refill: 0        Patient Instructions   Oral fluids  Rest  Steam (hot showers, hot tea)  Blow nose often  Avoid circulating air (such as ceiling fans) dries your airway  Avoid drinking cold drinks (worsens cough)  Avoid strong smells which could worsen cough (perfume, lotions, smoke...)  You can also try a humidifier  Therapeutic coughing to expel mucous  Sit in upright position often

## 2024-12-04 NOTE — PROGRESS NOTES
Subjective:      Patient ID: Clara Mills is a 53 y.o. female.    Vitals:  vitals were not taken for this visit.     Chief Complaint: Sore Throat (Cough, sore throat, congestion, runny nose. - Entered by patient)    Sore Throat   This is a new problem.     HENT:  Positive for sore throat.     Objective:     Physical Exam    Assessment:     1. Cough, unspecified type        Plan:       Cough, unspecified type  -     POCT COVID-19 Rapid Screening

## 2024-12-05 ENCOUNTER — HOSPITAL ENCOUNTER (OUTPATIENT)
Dept: RADIOLOGY | Facility: HOSPITAL | Age: 54
Discharge: HOME | End: 2024-12-05
Attending: OBSTETRICS & GYNECOLOGY
Payer: COMMERCIAL

## 2024-12-05 VITALS — WEIGHT: 125 LBS | BODY MASS INDEX: 21.46 KG/M2

## 2024-12-05 DIAGNOSIS — Z12.31 ENCOUNTER FOR SCREENING MAMMOGRAM FOR BREAST CANCER: ICD-10-CM

## 2024-12-05 PROCEDURE — 77067 SCR MAMMO BI INCL CAD: CPT | Mod: TC

## 2024-12-11 ENCOUNTER — TELEPHONE (OUTPATIENT)
Dept: RADIOLOGY | Facility: HOSPITAL | Age: 54
End: 2024-12-11
Payer: COMMERCIAL

## 2024-12-11 ENCOUNTER — PATIENT MESSAGE (OUTPATIENT)
Dept: OBSTETRICS AND GYNECOLOGY | Facility: CLINIC | Age: 54
End: 2024-12-11
Payer: COMMERCIAL

## 2024-12-11 ENCOUNTER — HOSPITAL ENCOUNTER (OUTPATIENT)
Dept: RADIOLOGY | Facility: HOSPITAL | Age: 54
Discharge: HOME | End: 2024-12-11
Attending: OBSTETRICS & GYNECOLOGY
Payer: COMMERCIAL

## 2024-12-11 DIAGNOSIS — R92.8 ABNORMAL MAMMOGRAM: ICD-10-CM

## 2024-12-11 DIAGNOSIS — R92.8 ABNORMAL MAMMOGRAM: Primary | ICD-10-CM

## 2024-12-11 PROCEDURE — 76642 ULTRASOUND BREAST LIMITED: CPT | Mod: 26,LT,, | Performed by: RADIOLOGY

## 2024-12-11 PROCEDURE — 76642 ULTRASOUND BREAST LIMITED: CPT | Mod: TC,LT

## 2024-12-11 NOTE — TELEPHONE ENCOUNTER
Spoke with patient and explained mammogram findings.Patient expressed understanding of results. Patient scheduled abnormal mammogram follow up appointment at [facility] on 12/11/2024.

## 2024-12-24 ENCOUNTER — TELEPHONE (OUTPATIENT)
Dept: SPORTS MEDICINE | Facility: CLINIC | Age: 54
End: 2024-12-24
Payer: COMMERCIAL

## 2024-12-24 NOTE — TELEPHONE ENCOUNTER
Spoke c pt. Informed pt that Dr. Zamora will no longer be seeing pts at scheduled appt time on 01/06/25. R/s appt to 1500. Confirmed appt date, time, location. Pt will call c additional questions/concerns in interim. Pt expressed understanding & was thankful.

## (undated) DEVICE — DRESSING LEUKOPLAST FLEX 1X3IN

## (undated) DEVICE — DEVICE ABLATION NOVASURE DISP

## (undated) DEVICE — SUT MCRYL PLUS 4-0 PS2 27IN

## (undated) DEVICE — JELLY SURGILUBE 5GR

## (undated) DEVICE — PAD ABD 8X10 STERILE

## (undated) DEVICE — KIT WING PAD POSITIONING

## (undated) DEVICE — NDL INSUF ULTRA VERESS 120MM

## (undated) DEVICE — SET BASIN 48X48IN 6000ML RING

## (undated) DEVICE — GLOVE BIOGEL SKINSENSE PI 6.0

## (undated) DEVICE — CLOSURE SKIN STERI STRIP 1/2X4

## (undated) DEVICE — SOL NS 1000CC

## (undated) DEVICE — PACK FLUENT DISPOSABLE

## (undated) DEVICE — TROCAR ENDOPATH XCEL 5X100MM

## (undated) DEVICE — SEAL LENS SCOPE MYOSURE

## (undated) DEVICE — DEVICE MYOSURE REACH SYS

## (undated) DEVICE — PACK LAPAROSCOPY BAPTIST

## (undated) DEVICE — SOL IRR NACL .9% 3000ML

## (undated) DEVICE — SOL CLEARIFY VISUALIZATION LAP

## (undated) DEVICE — SOL 9P NACL IRR PIC IL

## (undated) DEVICE — ELECTRODE REM PLYHSV RETURN 9

## (undated) DEVICE — CANNULA ENDOPATH XCEL 5X100MM